# Patient Record
Sex: FEMALE | Race: BLACK OR AFRICAN AMERICAN | NOT HISPANIC OR LATINO | Employment: UNEMPLOYED | URBAN - METROPOLITAN AREA
[De-identification: names, ages, dates, MRNs, and addresses within clinical notes are randomized per-mention and may not be internally consistent; named-entity substitution may affect disease eponyms.]

---

## 2020-10-12 LAB
HBV SURFACE AG SERPL QL IA: NEGATIVE
HIV 1+2 AB+HIV1 P24 AG SERPL QL IA: NEGATIVE
RUBELLA ABY IGG: NORMAL

## 2021-04-21 LAB — GROUP B STREP PCR: POSITIVE

## 2021-05-19 ENCOUNTER — HOSPITAL ENCOUNTER (INPATIENT)
Facility: CLINIC | Age: 31
LOS: 4 days | Discharge: HOME | End: 2021-05-23
Attending: OBSTETRICS & GYNECOLOGY | Admitting: OBSTETRICS & GYNECOLOGY
Payer: COMMERCIAL

## 2021-05-19 DIAGNOSIS — Z98.891 S/P PRIMARY LOW TRANSVERSE C-SECTION: Primary | ICD-10-CM

## 2021-05-19 LAB
ABO + RH BLD: NORMAL
ABO + RH BLD: NORMAL
LABORATORY COMMENT REPORT: NORMAL
SARS-COV-2 RNA RESP QL NAA+PROBE: NEGATIVE
SPECIMEN EXP DATE BLD: NORMAL
SPECIMEN SOURCE: NORMAL

## 2021-05-19 PROCEDURE — 120N000001 HC R&B MED SURG/OB

## 2021-05-19 PROCEDURE — 3E0P7VZ INTRODUCTION OF HORMONE INTO FEMALE REPRODUCTIVE, VIA NATURAL OR ARTIFICIAL OPENING: ICD-10-PCS | Performed by: OBSTETRICS & GYNECOLOGY

## 2021-05-19 PROCEDURE — 250N000013 HC RX MED GY IP 250 OP 250 PS 637: Performed by: OBSTETRICS & GYNECOLOGY

## 2021-05-19 PROCEDURE — 86780 TREPONEMA PALLIDUM: CPT | Performed by: OBSTETRICS & GYNECOLOGY

## 2021-05-19 PROCEDURE — 86900 BLOOD TYPING SEROLOGIC ABO: CPT | Performed by: OBSTETRICS & GYNECOLOGY

## 2021-05-19 PROCEDURE — 86901 BLOOD TYPING SEROLOGIC RH(D): CPT | Performed by: OBSTETRICS & GYNECOLOGY

## 2021-05-19 PROCEDURE — 87635 SARS-COV-2 COVID-19 AMP PRB: CPT | Performed by: OBSTETRICS & GYNECOLOGY

## 2021-05-19 RX ORDER — DIPHENHYDRAMINE HCL 25 MG
25-50 CAPSULE ORAL
Status: DISCONTINUED | OUTPATIENT
Start: 2021-05-19 | End: 2021-05-20

## 2021-05-19 RX ORDER — OXYCODONE AND ACETAMINOPHEN 5; 325 MG/1; MG/1
1 TABLET ORAL
Status: DISCONTINUED | OUTPATIENT
Start: 2021-05-19 | End: 2021-05-20

## 2021-05-19 RX ORDER — PRENATAL VIT/IRON FUM/FOLIC AC 27MG-0.8MG
1 TABLET ORAL DAILY
COMMUNITY

## 2021-05-19 RX ORDER — NALOXONE HYDROCHLORIDE 0.4 MG/ML
0.4 INJECTION, SOLUTION INTRAMUSCULAR; INTRAVENOUS; SUBCUTANEOUS
Status: DISCONTINUED | OUTPATIENT
Start: 2021-05-19 | End: 2021-05-20

## 2021-05-19 RX ORDER — NALOXONE HYDROCHLORIDE 0.4 MG/ML
0.2 INJECTION, SOLUTION INTRAMUSCULAR; INTRAVENOUS; SUBCUTANEOUS
Status: DISCONTINUED | OUTPATIENT
Start: 2021-05-19 | End: 2021-05-20

## 2021-05-19 RX ORDER — OXYTOCIN/0.9 % SODIUM CHLORIDE 30/500 ML
100-340 PLASTIC BAG, INJECTION (ML) INTRAVENOUS CONTINUOUS PRN
Status: COMPLETED | OUTPATIENT
Start: 2021-05-19 | End: 2021-05-20

## 2021-05-19 RX ORDER — IBUPROFEN 800 MG/1
800 TABLET, FILM COATED ORAL
Status: DISCONTINUED | OUTPATIENT
Start: 2021-05-19 | End: 2021-05-20

## 2021-05-19 RX ORDER — METHYLERGONOVINE MALEATE 0.2 MG/ML
200 INJECTION INTRAVENOUS
Status: DISCONTINUED | OUTPATIENT
Start: 2021-05-19 | End: 2021-05-20

## 2021-05-19 RX ORDER — TRANEXAMIC ACID 10 MG/ML
1 INJECTION, SOLUTION INTRAVENOUS EVERY 30 MIN PRN
Status: DISCONTINUED | OUTPATIENT
Start: 2021-05-19 | End: 2021-05-20

## 2021-05-19 RX ORDER — SODIUM CHLORIDE, SODIUM LACTATE, POTASSIUM CHLORIDE, CALCIUM CHLORIDE 600; 310; 30; 20 MG/100ML; MG/100ML; MG/100ML; MG/100ML
INJECTION, SOLUTION INTRAVENOUS CONTINUOUS
Status: DISCONTINUED | OUTPATIENT
Start: 2021-05-19 | End: 2021-05-20

## 2021-05-19 RX ORDER — CARBOPROST TROMETHAMINE 250 UG/ML
250 INJECTION, SOLUTION INTRAMUSCULAR
Status: DISCONTINUED | OUTPATIENT
Start: 2021-05-19 | End: 2021-05-20

## 2021-05-19 RX ORDER — ACETAMINOPHEN 325 MG/1
650 TABLET ORAL EVERY 4 HOURS PRN
Status: DISCONTINUED | OUTPATIENT
Start: 2021-05-19 | End: 2021-05-20

## 2021-05-19 RX ORDER — ZOLPIDEM TARTRATE 5 MG/1
5 TABLET ORAL
Status: DISCONTINUED | OUTPATIENT
Start: 2021-05-19 | End: 2021-05-20

## 2021-05-19 RX ORDER — ONDANSETRON 2 MG/ML
4 INJECTION INTRAMUSCULAR; INTRAVENOUS EVERY 6 HOURS PRN
Status: DISCONTINUED | OUTPATIENT
Start: 2021-05-19 | End: 2021-05-20

## 2021-05-19 RX ORDER — OXYTOCIN 10 [USP'U]/ML
10 INJECTION, SOLUTION INTRAMUSCULAR; INTRAVENOUS
Status: DISCONTINUED | OUTPATIENT
Start: 2021-05-19 | End: 2021-05-20

## 2021-05-19 RX ORDER — PENICILLIN G POTASSIUM 5000000 [IU]/1
5 INJECTION, POWDER, FOR SOLUTION INTRAMUSCULAR; INTRAVENOUS ONCE
Status: COMPLETED | OUTPATIENT
Start: 2021-05-19 | End: 2021-05-20

## 2021-05-19 RX ORDER — FENTANYL CITRATE 50 UG/ML
50-100 INJECTION, SOLUTION INTRAMUSCULAR; INTRAVENOUS
Status: DISCONTINUED | OUTPATIENT
Start: 2021-05-19 | End: 2021-05-20

## 2021-05-19 RX ADMIN — DINOPROSTONE 10 MG: 10 INSERT VAGINAL at 21:18

## 2021-05-19 ASSESSMENT — MIFFLIN-ST. JEOR: SCORE: 1783.22

## 2021-05-20 ENCOUNTER — ANESTHESIA EVENT (OUTPATIENT)
Dept: OBGYN | Facility: CLINIC | Age: 31
End: 2021-05-20
Payer: COMMERCIAL

## 2021-05-20 ENCOUNTER — ANESTHESIA (OUTPATIENT)
Dept: GENERAL RADIOLOGY | Facility: CLINIC | Age: 31
End: 2021-05-20

## 2021-05-20 ENCOUNTER — APPOINTMENT (OUTPATIENT)
Dept: GENERAL RADIOLOGY | Facility: CLINIC | Age: 31
End: 2021-05-20
Attending: OBSTETRICS & GYNECOLOGY
Payer: COMMERCIAL

## 2021-05-20 ENCOUNTER — ANESTHESIA (OUTPATIENT)
Dept: OBGYN | Facility: CLINIC | Age: 31
End: 2021-05-20
Payer: COMMERCIAL

## 2021-05-20 PROBLEM — O36.8390 NON-REASSURING FETAL HEART TONES COMPLICATING PREGNANCY, ANTEPARTUM: Status: ACTIVE | Noted: 2021-05-20

## 2021-05-20 LAB — T PALLIDUM AB SER QL: NONREACTIVE

## 2021-05-20 PROCEDURE — 250N000013 HC RX MED GY IP 250 OP 250 PS 637: Performed by: OBSTETRICS & GYNECOLOGY

## 2021-05-20 PROCEDURE — 370N000017 HC ANESTHESIA TECHNICAL FEE, PER MIN: Performed by: OBSTETRICS & GYNECOLOGY

## 2021-05-20 PROCEDURE — 272N000001 HC OR GENERAL SUPPLY STERILE: Performed by: OBSTETRICS & GYNECOLOGY

## 2021-05-20 PROCEDURE — 710N000010 HC RECOVERY PHASE 1, LEVEL 2, PER MIN: Performed by: OBSTETRICS & GYNECOLOGY

## 2021-05-20 PROCEDURE — 00HU33Z INSERTION OF INFUSION DEVICE INTO SPINAL CANAL, PERCUTANEOUS APPROACH: ICD-10-PCS | Performed by: ANESTHESIOLOGY

## 2021-05-20 PROCEDURE — 88307 TISSUE EXAM BY PATHOLOGIST: CPT | Mod: 26

## 2021-05-20 PROCEDURE — 250N000011 HC RX IP 250 OP 636: Performed by: NURSE ANESTHETIST, CERTIFIED REGISTERED

## 2021-05-20 PROCEDURE — 999N000063 XR ABDOMEN PORT 1 VIEWS

## 2021-05-20 PROCEDURE — 250N000009 HC RX 250: Performed by: OBSTETRICS & GYNECOLOGY

## 2021-05-20 PROCEDURE — 250N000011 HC RX IP 250 OP 636: Performed by: OBSTETRICS & GYNECOLOGY

## 2021-05-20 PROCEDURE — 250N000011 HC RX IP 250 OP 636: Performed by: ANESTHESIOLOGY

## 2021-05-20 PROCEDURE — 360N000076 HC SURGERY LEVEL 3, PER MIN: Performed by: OBSTETRICS & GYNECOLOGY

## 2021-05-20 PROCEDURE — 258N000003 HC RX IP 258 OP 636: Performed by: NURSE ANESTHETIST, CERTIFIED REGISTERED

## 2021-05-20 PROCEDURE — 3E0R3BZ INTRODUCTION OF ANESTHETIC AGENT INTO SPINAL CANAL, PERCUTANEOUS APPROACH: ICD-10-PCS | Performed by: ANESTHESIOLOGY

## 2021-05-20 PROCEDURE — 120N000001 HC R&B MED SURG/OB

## 2021-05-20 PROCEDURE — 88307 TISSUE EXAM BY PATHOLOGIST: CPT | Mod: TC | Performed by: OBSTETRICS & GYNECOLOGY

## 2021-05-20 PROCEDURE — 258N000003 HC RX IP 258 OP 636: Performed by: OBSTETRICS & GYNECOLOGY

## 2021-05-20 RX ORDER — ONDANSETRON 2 MG/ML
INJECTION INTRAMUSCULAR; INTRAVENOUS PRN
Status: DISCONTINUED | OUTPATIENT
Start: 2021-05-20 | End: 2021-05-20

## 2021-05-20 RX ORDER — NALOXONE HYDROCHLORIDE 0.4 MG/ML
0.4 INJECTION, SOLUTION INTRAMUSCULAR; INTRAVENOUS; SUBCUTANEOUS
Status: DISCONTINUED | OUTPATIENT
Start: 2021-05-20 | End: 2021-05-23 | Stop reason: HOSPADM

## 2021-05-20 RX ORDER — DEXAMETHASONE SODIUM PHOSPHATE 4 MG/ML
INJECTION, SOLUTION INTRA-ARTICULAR; INTRALESIONAL; INTRAMUSCULAR; INTRAVENOUS; SOFT TISSUE PRN
Status: DISCONTINUED | OUTPATIENT
Start: 2021-05-20 | End: 2021-05-20

## 2021-05-20 RX ORDER — ONDANSETRON 2 MG/ML
4 INJECTION INTRAMUSCULAR; INTRAVENOUS EVERY 6 HOURS PRN
Status: DISCONTINUED | OUTPATIENT
Start: 2021-05-20 | End: 2021-05-23 | Stop reason: HOSPADM

## 2021-05-20 RX ORDER — MORPHINE SULFATE 1 MG/ML
INJECTION, SOLUTION EPIDURAL; INTRATHECAL; INTRAVENOUS PRN
Status: DISCONTINUED | OUTPATIENT
Start: 2021-05-20 | End: 2021-05-20

## 2021-05-20 RX ORDER — FENTANYL CITRATE 50 UG/ML
100 INJECTION, SOLUTION INTRAMUSCULAR; INTRAVENOUS ONCE
Status: COMPLETED | OUTPATIENT
Start: 2021-05-20 | End: 2021-05-20

## 2021-05-20 RX ORDER — NALOXONE HYDROCHLORIDE 0.4 MG/ML
0.2 INJECTION, SOLUTION INTRAMUSCULAR; INTRAVENOUS; SUBCUTANEOUS
Status: DISCONTINUED | OUTPATIENT
Start: 2021-05-20 | End: 2021-05-23 | Stop reason: HOSPADM

## 2021-05-20 RX ORDER — ONDANSETRON 2 MG/ML
4 INJECTION INTRAMUSCULAR; INTRAVENOUS EVERY 30 MIN PRN
Status: DISCONTINUED | OUTPATIENT
Start: 2021-05-20 | End: 2021-05-20 | Stop reason: HOSPADM

## 2021-05-20 RX ORDER — AMOXICILLIN 250 MG
2 CAPSULE ORAL 2 TIMES DAILY
Status: DISCONTINUED | OUTPATIENT
Start: 2021-05-20 | End: 2021-05-23 | Stop reason: HOSPADM

## 2021-05-20 RX ORDER — OXYTOCIN/0.9 % SODIUM CHLORIDE 30/500 ML
100 PLASTIC BAG, INJECTION (ML) INTRAVENOUS CONTINUOUS
Status: DISCONTINUED | OUTPATIENT
Start: 2021-05-20 | End: 2021-05-23 | Stop reason: HOSPADM

## 2021-05-20 RX ORDER — IBUPROFEN 800 MG/1
800 TABLET, FILM COATED ORAL EVERY 6 HOURS
Status: DISCONTINUED | OUTPATIENT
Start: 2021-05-21 | End: 2021-05-23 | Stop reason: HOSPADM

## 2021-05-20 RX ORDER — LIDOCAINE 40 MG/G
CREAM TOPICAL
Status: DISCONTINUED | OUTPATIENT
Start: 2021-05-20 | End: 2021-05-23 | Stop reason: HOSPADM

## 2021-05-20 RX ORDER — OXYTOCIN 10 [USP'U]/ML
10 INJECTION, SOLUTION INTRAMUSCULAR; INTRAVENOUS
Status: DISCONTINUED | OUTPATIENT
Start: 2021-05-20 | End: 2021-05-23 | Stop reason: HOSPADM

## 2021-05-20 RX ORDER — PHENYLEPHRINE HYDROCHLORIDE 10 MG/ML
INJECTION INTRAVENOUS PRN
Status: DISCONTINUED | OUTPATIENT
Start: 2021-05-20 | End: 2021-05-20

## 2021-05-20 RX ORDER — ACETAMINOPHEN 325 MG/1
975 TABLET ORAL EVERY 6 HOURS
Status: DISCONTINUED | OUTPATIENT
Start: 2021-05-20 | End: 2021-05-23 | Stop reason: HOSPADM

## 2021-05-20 RX ORDER — SODIUM CHLORIDE, SODIUM LACTATE, POTASSIUM CHLORIDE, CALCIUM CHLORIDE 600; 310; 30; 20 MG/100ML; MG/100ML; MG/100ML; MG/100ML
INJECTION, SOLUTION INTRAVENOUS CONTINUOUS PRN
Status: DISCONTINUED | OUTPATIENT
Start: 2021-05-20 | End: 2021-05-20

## 2021-05-20 RX ORDER — METHYLERGONOVINE MALEATE 0.2 MG/ML
200 INJECTION INTRAVENOUS
Status: DISCONTINUED | OUTPATIENT
Start: 2021-05-20 | End: 2021-05-23 | Stop reason: HOSPADM

## 2021-05-20 RX ORDER — HYDROCORTISONE 2.5 %
CREAM (GRAM) TOPICAL 3 TIMES DAILY PRN
Status: DISCONTINUED | OUTPATIENT
Start: 2021-05-20 | End: 2021-05-23 | Stop reason: HOSPADM

## 2021-05-20 RX ORDER — ONDANSETRON 4 MG/1
4 TABLET, ORALLY DISINTEGRATING ORAL EVERY 30 MIN PRN
Status: DISCONTINUED | OUTPATIENT
Start: 2021-05-20 | End: 2021-05-20 | Stop reason: HOSPADM

## 2021-05-20 RX ORDER — SODIUM CHLORIDE, SODIUM LACTATE, POTASSIUM CHLORIDE, CALCIUM CHLORIDE 600; 310; 30; 20 MG/100ML; MG/100ML; MG/100ML; MG/100ML
INJECTION, SOLUTION INTRAVENOUS CONTINUOUS
Status: DISCONTINUED | OUTPATIENT
Start: 2021-05-20 | End: 2021-05-20 | Stop reason: HOSPADM

## 2021-05-20 RX ORDER — SCOLOPAMINE TRANSDERMAL SYSTEM 1 MG/1
1 PATCH, EXTENDED RELEASE TRANSDERMAL
Status: DISCONTINUED | OUTPATIENT
Start: 2021-05-20 | End: 2021-05-20

## 2021-05-20 RX ORDER — BISACODYL 10 MG
10 SUPPOSITORY, RECTAL RECTAL DAILY PRN
Status: DISCONTINUED | OUTPATIENT
Start: 2021-05-22 | End: 2021-05-23 | Stop reason: HOSPADM

## 2021-05-20 RX ORDER — ACETAMINOPHEN 325 MG/1
975 TABLET ORAL ONCE
Status: DISCONTINUED | OUTPATIENT
Start: 2021-05-20 | End: 2021-05-20 | Stop reason: HOSPADM

## 2021-05-20 RX ORDER — MODIFIED LANOLIN
OINTMENT (GRAM) TOPICAL
Status: DISCONTINUED | OUTPATIENT
Start: 2021-05-20 | End: 2021-05-23 | Stop reason: HOSPADM

## 2021-05-20 RX ORDER — FENTANYL CITRATE-0.9 % NACL/PF 10 MCG/ML
100 PLASTIC BAG, INJECTION (ML) INTRAVENOUS EVERY 5 MIN PRN
Status: DISCONTINUED | OUTPATIENT
Start: 2021-05-20 | End: 2021-05-20

## 2021-05-20 RX ORDER — FENTANYL CITRATE 50 UG/ML
INJECTION, SOLUTION INTRAMUSCULAR; INTRAVENOUS
Status: COMPLETED | OUTPATIENT
Start: 2021-05-20 | End: 2021-05-20

## 2021-05-20 RX ORDER — DEXTROSE, SODIUM CHLORIDE, SODIUM LACTATE, POTASSIUM CHLORIDE, AND CALCIUM CHLORIDE 5; .6; .31; .03; .02 G/100ML; G/100ML; G/100ML; G/100ML; G/100ML
INJECTION, SOLUTION INTRAVENOUS CONTINUOUS
Status: DISCONTINUED | OUTPATIENT
Start: 2021-05-20 | End: 2021-05-23 | Stop reason: HOSPADM

## 2021-05-20 RX ORDER — PRENATAL VIT/IRON FUM/FOLIC AC 27MG-0.8MG
1 TABLET ORAL DAILY
Status: DISCONTINUED | OUTPATIENT
Start: 2021-05-20 | End: 2021-05-23 | Stop reason: HOSPADM

## 2021-05-20 RX ORDER — MISOPROSTOL 200 UG/1
800 TABLET ORAL
Status: DISCONTINUED | OUTPATIENT
Start: 2021-05-20 | End: 2021-05-23 | Stop reason: HOSPADM

## 2021-05-20 RX ORDER — CARBOPROST TROMETHAMINE 250 UG/ML
250 INJECTION, SOLUTION INTRAMUSCULAR
Status: DISCONTINUED | OUTPATIENT
Start: 2021-05-20 | End: 2021-05-23 | Stop reason: HOSPADM

## 2021-05-20 RX ORDER — SIMETHICONE 80 MG
80 TABLET,CHEWABLE ORAL 4 TIMES DAILY PRN
Status: DISCONTINUED | OUTPATIENT
Start: 2021-05-20 | End: 2021-05-23 | Stop reason: HOSPADM

## 2021-05-20 RX ORDER — KETOROLAC TROMETHAMINE 30 MG/ML
30 INJECTION, SOLUTION INTRAMUSCULAR; INTRAVENOUS EVERY 6 HOURS
Status: COMPLETED | OUTPATIENT
Start: 2021-05-20 | End: 2021-05-21

## 2021-05-20 RX ORDER — FENTANYL CITRATE 50 UG/ML
INJECTION, SOLUTION INTRAMUSCULAR; INTRAVENOUS PRN
Status: DISCONTINUED | OUTPATIENT
Start: 2021-05-20 | End: 2021-05-20

## 2021-05-20 RX ORDER — FENTANYL CITRATE 50 UG/ML
25-50 INJECTION, SOLUTION INTRAMUSCULAR; INTRAVENOUS EVERY 5 MIN PRN
Status: DISCONTINUED | OUTPATIENT
Start: 2021-05-20 | End: 2021-05-20 | Stop reason: HOSPADM

## 2021-05-20 RX ORDER — OXYTOCIN/0.9 % SODIUM CHLORIDE 30/500 ML
340 PLASTIC BAG, INJECTION (ML) INTRAVENOUS CONTINUOUS PRN
Status: DISCONTINUED | OUTPATIENT
Start: 2021-05-20 | End: 2021-05-23 | Stop reason: HOSPADM

## 2021-05-20 RX ORDER — NALBUPHINE HYDROCHLORIDE 10 MG/ML
2.5-5 INJECTION, SOLUTION INTRAMUSCULAR; INTRAVENOUS; SUBCUTANEOUS EVERY 6 HOURS PRN
Status: DISCONTINUED | OUTPATIENT
Start: 2021-05-20 | End: 2021-05-20

## 2021-05-20 RX ORDER — KETOROLAC TROMETHAMINE 30 MG/ML
INJECTION, SOLUTION INTRAMUSCULAR; INTRAVENOUS PRN
Status: DISCONTINUED | OUTPATIENT
Start: 2021-05-20 | End: 2021-05-20

## 2021-05-20 RX ORDER — OXYCODONE HYDROCHLORIDE 5 MG/1
5 TABLET ORAL EVERY 4 HOURS PRN
Status: DISCONTINUED | OUTPATIENT
Start: 2021-05-20 | End: 2021-05-23 | Stop reason: HOSPADM

## 2021-05-20 RX ORDER — PROPOFOL 10 MG/ML
INJECTION, EMULSION INTRAVENOUS PRN
Status: DISCONTINUED | OUTPATIENT
Start: 2021-05-20 | End: 2021-05-20

## 2021-05-20 RX ORDER — TRANEXAMIC ACID 10 MG/ML
1 INJECTION, SOLUTION INTRAVENOUS EVERY 30 MIN PRN
Status: DISCONTINUED | OUTPATIENT
Start: 2021-05-20 | End: 2021-05-23 | Stop reason: HOSPADM

## 2021-05-20 RX ORDER — AMOXICILLIN 250 MG
1 CAPSULE ORAL 2 TIMES DAILY
Status: DISCONTINUED | OUTPATIENT
Start: 2021-05-20 | End: 2021-05-23 | Stop reason: HOSPADM

## 2021-05-20 RX ADMIN — ONDANSETRON 4 MG: 2 INJECTION INTRAMUSCULAR; INTRAVENOUS at 09:15

## 2021-05-20 RX ADMIN — PHENYLEPHRINE HYDROCHLORIDE 100 MCG: 10 INJECTION INTRAVENOUS at 09:08

## 2021-05-20 RX ADMIN — PHENYLEPHRINE HYDROCHLORIDE 100 MCG: 10 INJECTION INTRAVENOUS at 09:15

## 2021-05-20 RX ADMIN — FENTANYL CITRATE 25 MCG: 50 INJECTION, SOLUTION INTRAMUSCULAR; INTRAVENOUS at 09:19

## 2021-05-20 RX ADMIN — DEXAMETHASONE SODIUM PHOSPHATE 4 MG: 4 INJECTION, SOLUTION INTRA-ARTICULAR; INTRALESIONAL; INTRAMUSCULAR; INTRAVENOUS; SOFT TISSUE at 09:15

## 2021-05-20 RX ADMIN — ACETAMINOPHEN 975 MG: 325 TABLET, FILM COATED ORAL at 12:03

## 2021-05-20 RX ADMIN — KETOROLAC TROMETHAMINE 30 MG: 30 INJECTION, SOLUTION INTRAMUSCULAR; INTRAVENOUS at 15:52

## 2021-05-20 RX ADMIN — SODIUM CHLORIDE, POTASSIUM CHLORIDE, SODIUM LACTATE AND CALCIUM CHLORIDE: 600; 310; 30; 20 INJECTION, SOLUTION INTRAVENOUS at 07:44

## 2021-05-20 RX ADMIN — ACETAMINOPHEN 975 MG: 325 TABLET, FILM COATED ORAL at 18:36

## 2021-05-20 RX ADMIN — KETOROLAC TROMETHAMINE 30 MG: 30 INJECTION, SOLUTION INTRAMUSCULAR; INTRAVENOUS at 21:50

## 2021-05-20 RX ADMIN — MORPHINE SULFATE 3 MG: 1 INJECTION EPIDURAL; INTRATHECAL; INTRAVENOUS at 08:57

## 2021-05-20 RX ADMIN — SODIUM CHLORIDE, POTASSIUM CHLORIDE, SODIUM LACTATE AND CALCIUM CHLORIDE: 600; 310; 30; 20 INJECTION, SOLUTION INTRAVENOUS at 05:53

## 2021-05-20 RX ADMIN — KETOROLAC TROMETHAMINE 30 MG: 30 INJECTION, SOLUTION INTRAMUSCULAR at 09:26

## 2021-05-20 RX ADMIN — HYDROMORPHONE HYDROCHLORIDE 0.5 MG: 1 INJECTION, SOLUTION INTRAMUSCULAR; INTRAVENOUS; SUBCUTANEOUS at 09:34

## 2021-05-20 RX ADMIN — FENTANYL CITRATE 100 MCG: 50 INJECTION, SOLUTION INTRAMUSCULAR; INTRAVENOUS at 05:06

## 2021-05-20 RX ADMIN — ACETAMINOPHEN 975 MG: 325 TABLET, FILM COATED ORAL at 23:59

## 2021-05-20 RX ADMIN — PROPOFOL 150 MG: 10 INJECTION, EMULSION INTRAVENOUS at 08:51

## 2021-05-20 RX ADMIN — Medication 5 ML/HR: at 08:26

## 2021-05-20 RX ADMIN — PENICILLIN G POTASSIUM 5 MILLION UNITS: 5000000 POWDER, FOR SOLUTION INTRAMUSCULAR; INTRAPLEURAL; INTRATHECAL; INTRAVENOUS at 07:40

## 2021-05-20 RX ADMIN — SODIUM CHLORIDE, POTASSIUM CHLORIDE, SODIUM LACTATE AND CALCIUM CHLORIDE: 600; 310; 30; 20 INJECTION, SOLUTION INTRAVENOUS at 09:50

## 2021-05-20 RX ADMIN — FENTANYL CITRATE 50 MCG: 50 INJECTION, SOLUTION INTRAMUSCULAR; INTRAVENOUS at 09:24

## 2021-05-20 RX ADMIN — PROPOFOL 50 MG: 10 INJECTION, EMULSION INTRAVENOUS at 08:52

## 2021-05-20 RX ADMIN — Medication 100 ML/HR: at 11:27

## 2021-05-20 RX ADMIN — FENTANYL CITRATE 100 MCG: 50 INJECTION, SOLUTION INTRAMUSCULAR; INTRAVENOUS at 08:20

## 2021-05-20 RX ADMIN — FENTANYL CITRATE 25 MCG: 50 INJECTION, SOLUTION INTRAMUSCULAR; INTRAVENOUS at 09:15

## 2021-05-20 RX ADMIN — SUCCINYLCHOLINE CHLORIDE 60 MG: 20 INJECTION, SOLUTION INTRAMUSCULAR; INTRAVENOUS at 08:51

## 2021-05-20 RX ADMIN — FENTANYL CITRATE 100 MCG: 50 INJECTION, SOLUTION INTRAMUSCULAR; INTRAVENOUS at 02:35

## 2021-05-20 RX ADMIN — FENTANYL CITRATE 100 MCG: 50 INJECTION, SOLUTION INTRAMUSCULAR; INTRAVENOUS at 08:21

## 2021-05-20 RX ADMIN — SODIUM CHLORIDE, SODIUM LACTATE, POTASSIUM CHLORIDE, CALCIUM CHLORIDE AND DEXTROSE MONOHYDRATE: 5; 600; 310; 30; 20 INJECTION, SOLUTION INTRAVENOUS at 16:51

## 2021-05-20 RX ADMIN — HYDROMORPHONE HYDROCHLORIDE 0.5 MG: 1 INJECTION, SOLUTION INTRAMUSCULAR; INTRAVENOUS; SUBCUTANEOUS at 09:42

## 2021-05-20 RX ADMIN — DOCUSATE SODIUM 50 MG AND SENNOSIDES 8.6 MG 1 TABLET: 8.6; 5 TABLET, FILM COATED ORAL at 21:50

## 2021-05-20 NOTE — PROVIDER NOTIFICATION
21 0834   Provider Notification   Provider Name/Title Dr Martinez    Method of Notification At Bedside   Request Evaluate in Person   Notification Reason Decels   After epidural pt was placed in left lateral position. FHR decles noted with a nadar of 115's lasting approx 100 seconds returning to a baseline in the 135's. Unable to trace any contractions although monitor seems to be placed appropriately. Abd palpated and found to be soft. FHR then dropped onto 60's, prolonged deceleration lasting 290 seconds.  brief recovery then FHR into another decel with a nadar in the 50's. Multiple Position changes, IV fluids bolus and Dr Martinez on unit. Call bedside to evaluate decels. FSE was placed by MD with AROM of clear fluid. Decision made to precede to STAT  section. Verbal consent received from pt.

## 2021-05-20 NOTE — PLAN OF CARE
Data: Cinthya Ang transferred to [facility] via cart at 1145. Baby in NICU, able to visit prior to transfer to mother/baby unit.  Action: Receiving unit notified of transfer: Yes. Patient and family notified of room change. Report given to Elizabet WEN RN at 1150. Belongings sent to receiving unit. Accompanied by Registered Nurse. Oriented patient to surroundings. Call light within reach.  Turned in bed and pericare done.  Response: Patient tolerated transfer and is stable.    Patients mobililty level scored using the bedside mobility assistance tool (BMAT). Patient is at a mobility level test number: 1. Mobility equipment used: [employer]. Required assist of 2 staff members. Further use of BMAT scoring required.

## 2021-05-20 NOTE — PLAN OF CARE
Assumed cares at 11.50 am, heating pad given for backache of 4 , improved with heating pad. VS all stable, incision well approximated and open to air, BS audible , has eaten a light diet from home as feels hungry, no nausea, orientated to room. IPAD in situ to watch baby in NICU. FOB present and supportive. IV pitocin infusing. Isaac catheter patent and draining.Plan to start pumping later this afternoon.  Patients mobililty level scored using the bedside mobility assistance tool (BMAT). Patient is at a mobility level test number: 1. Mobility equipment used: [employer]. Required assist of 3 staff members. Further use of BMAT scoring required.

## 2021-05-20 NOTE — ANESTHESIA PREPROCEDURE EVALUATION
Anesthesia Pre-Procedure Evaluation    Patient: Cinthya Ang   MRN: [id number] : 1990        Preoperative Diagnosis: * No pre-op diagnosis entered *   Procedure : * No procedures listed *     History reviewed. No pertinent past medical history.   Past Surgical History:   Procedure Laterality Date     GYN SURGERY Left     cyst & fallopian tube removal      No Known Allergies   Social History     Tobacco Use     Smoking status: Never Smoker     Smokeless tobacco: Never Used   Substance Use Topics     Alcohol use: Not Currently      Wt Readings from Last 1 Encounters:   21 98.9 kg (218 lb)        Anesthesia Evaluation   Pt has had prior anesthetic.     No history of anesthetic complications       ROS/MED HX  ENT/Pulmonary:  - neg pulmonary ROS     Neurologic:  - neg neurologic ROS     Cardiovascular:  - neg cardiovascular ROS     METS/Exercise Tolerance:     Hematologic:       Musculoskeletal:       GI/Hepatic:  - neg GI/hepatic ROS     Renal/Genitourinary:       Endo:  - neg endo ROS     Psychiatric/Substance Use:  - neg psychiatric ROS     Infectious Disease:       Malignancy:       Other:            Physical Exam    Airway         TM distance: > 3 FB   Neck ROM: full   Mouth opening: > 3 cm    Respiratory Devices and Support         Dental           Cardiovascular             Pulmonary               Other findings:     OUTSIDE LABS:  CBC: No results found for: WBC, HGB, HCT, PLT  BMP: No results found for: NA, POTASSIUM, CHLORIDE, CO2, BUN, CR, GLC  COAGS: No results found for: PTT, INR, FIBR  POC: No results found for: BGM, HCG, HCGS  HEPATIC: No results found for: ALBUMIN, PROTTOTAL, ALT, AST, GGT, ALKPHOS, BILITOTAL, BILIDIRECT, BEBA  OTHER: No results found for: PH, LACT, A1C, GEOVANY, PHOS, MAG, LIPASE, AMYLASE, TSH, T4, T3, CRP, SED    Anesthesia Plan    ASA Status:  2      Anesthesia Type: Epidural.              Consents    Anesthesia Plan(s) and associated risks, benefits, and realistic  alternatives discussed. Questions answered and patient/representative(s) expressed understanding.     - Discussed with:  Patient         Postoperative Care            Comments:           neg OB ROS.       Parish Garcia MD

## 2021-05-20 NOTE — PROGRESS NOTES
"[facility]   PROGRESS NOTE     S. Pt states she is doing well overall. Feeling painful contractions. +FM    /56   Temp 97.9  F (36.6  C) (Oral)   Resp 18   Ht 1.753 m (5' 9\")   Wt 98.9 kg (218 lb)   BMI 32.19 kg/m      [place] 3-4   bpm, mod, a +, d-  SVE 2.5/80/0    A/P Cinthya Ang is a 30 year old  at 41w1d here for late term IOL. Pregnancy complicated by :  - s/p LSO for teratoma 2020   - hx of latent TB, neg CXR in    - failed GCT, passed 2 hr GTT  - obesity, BMI 31    - GBS pos - starting PCN now, once 4 hr gisela will attempt AROM  - start pitocin   - ok for epidural   - FHT Cat I  - continue monitoring    Dr. Nicholas Martinez  [phone]       "

## 2021-05-20 NOTE — OP NOTE
Section Procedure Note    Procedure Date: May 20, 2021     Pre-operative Diagnosis:  1. IUP at 41w1d  2. NRFHT's remote from delivery  Post-operative Diagnosis: same  Procedure: p-LTCS  Surgeon: Dr. Martinez  Assistant:   Anesthesia: General  MDA: Dr. Garcia  Estimated Blood Loss: 468 ml  Total IV Fluids: 600 ml  UOP: 0 ml (No Isaac catheter)  Findings:  - Liveborn male infant at 0854 hrs, clear fluid,. APGAR scores at 1 and 5 min were 8 and 9 respectively. EFW 3090 g.   - Placenta manually removed intact with 3VC. Normal uterus, bilateral fallopian tubes and ovaries.  - loose nuchal cord, no knots, placenta appeared normal  Indication for : STAT p-LTCS  Procedure: Final verification of patient and procedure was done. The patient was placed in the supine position with left lateral tilt and was prepped and draped in the usual sterile fashion. 2 g of Cefazolin were given preoperatively. A pfannenstiel skin incision was made with the scalpel and carried to the level of the fascia using the scalpel. The rectus fascia was dissected off of the muscle and the peritoneal cavity was entered bluntly. The  retractor was placed for protection of the bladder. A low transverse uterine incision was made and extended laterally bluntly.   Entry into the amniotic sac revealed clear fluid. Under controlled conditions, the fetal head was delivered from JESSE position. The nose and oropharynx were bulb suctioned. There was a loose nuchal cord through which baby was delivered without complications.  Both shoulders were delivered without complication.  Delayed cord clamping was done.Then the umbilical cord was doubly clamped and cut and the infant was handed to the awaiting pediatricians. Cord gases  were sent. The placenta was manually removed intact with 3-vessel cord and 30 units of pitocin were added to the existing IV bag. The placenta was  sent to pathology. The uterine cavity was cleared with a sponge. The  edges of the uterine incision were grasped with Allis clamps and the uterine incision was closed in two layers, first with a running, locking stitch of 0-vicryl, the second an imbricating non-locking stitch of 0-monocryl. Hemostasis was achieved. The abdomen and the gutters were cleared of old blood and clots. Tubes and ovaries appeared normal bilaterally. The fascia was closed with a running suture of 0-vicryl. The adipose layer was re-approximated with 2-0 plain simple interrupted stitches. The skin was closed with with 4-0 monocryl.   The patient tolerated the procedure well. Needle, sponge and instrument counts were correct. A sterile dressing was placed over the incision. The patient was taken to the recovery room in stable condition.    Specimens: cord gases, placenta  Complications: none  Disposition: Recovery  Condition: stable    Dr. JOSE ALEJANDRO Martinez  [phone]

## 2021-05-20 NOTE — PLAN OF CARE
Data: [facility] presented to [facility]: 2021  7:40 PM.  Reason for maternal/fetal assessment is cervical ripening/induction for post dates. Patient is a .  Prenatal record reviewed. Pregnancy has been uncomplicated..  Gestational Age 41w0d. VSS. Fetal movement active. Patient denies leaking of vaginal fluid/rupture of membranes, vaginal bleeding, abdominal pain, pelvic pressure, nausea, vomiting, headache, visual disturbances, epigastric or URQ pain, significant edema. Support person is present.   Action: Verbal consent for EFM. Triage assessment completed. Bill of rights reviewed.  Response: Patient verbalized agreement with plan. Will contact Dr Monika Wolf with update and for further orders.

## 2021-05-20 NOTE — PROVIDER NOTIFICATION
05/20/21 0824   Uterine Activity Assessment   Method external tocotransducer   Contraction Frequency (Minutes) adjusted at bedside for position  (pt standing at bedside for part of time segment)   Uterine Resting Tone soft by palpation   Pt up to bathroom and standing/ swaying bedside for comfort. Monitors adjusted prn. Prepping for epidural placement.  Pt sitting on edge of bed at 0815 once MDA in room for placement.

## 2021-05-20 NOTE — PROVIDER NOTIFICATION
05/20/21 0603   Provider Notification   Provider Name/Title Dr. Wolf   Method of Notification Phone   Notification Reason Status Update     MD updated on patient status. Patient getting more uncomfortable within the last few hours and has been having rolling contractions on and off for the past hour or so. IV fluids started which helped space contractions out a little bit. SVE 2-2.5/80/0 with a walter score of 9. Cervadil had come out into the vaginal canal and was removed. FHT overall moderate with accelerations and periods of minimal. Orders from MD to start pitocin per protocol if contractions space out and to start penicillin in about an hour, regardless if pitocin is started or not. MD is okay with patient eating a light breakfast and taking a shower if she wants, and then starting pitocin after if appropriate. No cervical recheck is needed before pitocin is started if contractions space out. Will update MD as needed.

## 2021-05-20 NOTE — L&D DELIVERY NOTE
Please see OP note for further details.    Dr. Nicholas Martinez  [phone]      Delivery Summary    Cinthya Ang MRN# [id number]   Age: 30 year old YOB: 1990          Tangela Ang [[id number]]    Labor Events     labor?: No   steroids: None  Labor Type: Induction/Cervical ripening  Predominate monitoring during 1st stage: continuous electronic fetal monitoring     Antibiotics received during labor?: Yes  Reason for Antibiotics: GBS  Antibiotics received for GBS: Penicillin     Induction date/time:     Cervical ripening date/time: 21       Delivery/Placenta Date and Time    Delivery Date: 21 Delivery Time:  8:54 AM   Placenta Date/Time: 2021  8:55 AM          Apgars    Living status: Living   1 Minute 5 Minute 10 Minute 15 Minute 20 Minute   Skin color: 0  1       Heart rate: 2  2       Reflex irritability: 2  2       Muscle tone: 2  2       Respiratory effort: 2  2       Total: 8  9       Apgars assigned by: AURORA MATHUR     Cord    Vessels: 3 Vessels                Cord Blood Disposition: Lab    Gases Sent?: Yes    Delayed cord clamping?: Yes    Cord Clamping Delay (seconds): 31-60 seconds        Measurements    Weight: 6 lb 13 oz       Delivery (Maternal) (Provider to Complete) ([id number])       Blood Loss  Mother: Cinthya nAg #[id number]   Start of Mother's Information    IO Blood Loss  21 0850 - 21 1000    None           End of Mother's Information  Mother: Cinthya Ang #[id number]          Placenta    Date/Time: 2021  8:55 AM           Anesthesia    Method: Epidural  Cervical dilation at placement: 0-3                       Ashleigh Martinez MD

## 2021-05-20 NOTE — PROVIDER NOTIFICATION
21   Provider Notification   Provider Name/Title Dr. Wolf   Method of Notification Phone   Request Evaluate - Remote   Notification Reason Patient Arrived     MD notified of patient arrival.  at 41 weeks here for postdates induction. Uncomplicated pregnancy history other than GBS +; will start when patient is in active labor or per protocol. Occasional contractions noted on the toco, palpating mild. FHT minimal at first but is now moderate with accelerations. SVE 1/70/-1 with a walter score of 7. Ripening options discussed with patient and she is requesting to do cervadil. Orders from MD for intrapartum order set, cervadil for cervical ripening, covid swab, ambien 5mg PO at bedtime PRN or benadryl 25-50 mg PO at bedtime PRN. Will update MD as needed.

## 2021-05-20 NOTE — H&P
[facility]     History and Physical  Obstetrics and Gynecology     Date of Admission:  2021    History of Present Illness   Cinthya Ang is a 30 year old female  41w0d with Estimated Date of Delivery: May 12, 2021 who presents for post dates induction    [facility] for [facility] of [facility].  Indication post dates.    PRENATAL COURSE  Prenatal care was received at [facility] and the  course was  - s/p LSO for teratoma 2020   - hx of latent TB, neg CXR in    - failed GCT, passed 2 hr GTT  - obesity, BMI 31       Recent Labs   Lab Test 21   ABO O   RH Pos     Rhogam not indicated   Recent Labs   Lab Test 04/21/21 10/12/20   HEPBANG  --  negative   HIAGAB  --  negative   GBS positive   --        Past Medical History    Past medical history reviewed      Past Surgical History   Past surgical history reviewed      Prior to Admission Medications   Prior to Admission Medications   Prescriptions Last Dose Informant Patient Reported? Taking?   Prenatal Vit-Fe Fumarate-FA (PRENATAL MULTIVITAMIN W/IRON) 27-0.8 MG tablet 2021 at Unknown time  Yes Yes   Sig: Take 1 tablet by mouth daily      Facility-Administered Medications: None     Allergies   No Known Allergies    Social History   I have personally reviewed the social history     Family History   Family history reviewed with patient and is noncontributory.    Immunization History   Immunizations are up to date  S/p flu 10/12/20, Tdap 3/9/21      Physical Exam   Temp: 97.6  F (36.4  C) Temp src: Oral BP: 111/81     Resp: 16        Vital Signs with Ranges  Temp:  [97.6  F (36.4  C)] 97.6  F (36.4  C)  Resp:  [16] 16  BP: (111)/(81) 111/81  Fetal Heart Tones: 130 baseline, moderate variablility, + accels, no decels and Category I  TOCO: frequency q 2.5-4 minutes    Constitutional: healthy, alert and active   Respiratory: No increased work of breathing, good air  exchange, clear to auscultation bilaterally, no crackles or wheezing  Cardiovascular: Normal apical impulse, regular rate and rhythm, normal S1 and S2, no S3 or S4, and no murmur noted  Abdomen: gravid, single vertex fetus, non-tender, EFW 7 lbs 10  Cervical Exam: / Posterior/ average/ -1       Assessment & Plan   Cinthya Ang is a 30 year old female  who presents at 41w0d for induction.   GBS positive.   NST reactive.  Category  I.     Admit - see IP orders  cervical ripening with cervidil  Prophylactic antibiotic for + GBS status when in active labor  Anticipate     Monika Wolf, DO, DO

## 2021-05-20 NOTE — ANESTHESIA PROCEDURE NOTES
Epidural catheter Procedure Note  Pre-Procedure   Staff -        Anesthesiologist:  Parish Garcia MD       Performed By: anesthesiologist       Location: OB       Procedure Start/Stop Times: 5/20/2021 8:11 AM and 5/20/2021 8:28 AM       Pre-Anesthestic Checklist: patient identified, IV checked, risks and benefits discussed, informed consent, monitors and equipment checked, pre-op evaluation and at physician/surgeon's request  Timeout:       Correct Patient: Yes        Correct Procedure: Yes        Correct Site: Yes        Correct Position: Yes   Procedure Documentation  Procedure: epidural catheter       Patient Position: sitting       Patient Prep/Sterile Barriers: sterile gloves, mask, patient draped       Skin prep: Betadine       Local skin infiltrated with 3 mL of 1% lidocaine.        Insertion Site: L3-4. (midline approach).       Technique: LORT saline        JACKSON at 7 cm.       Needle Type: JumpHawky needle       Needle Gauge: 17.        Needle Length (Inches): 3.5        Catheter: 19 G.         Catheter threaded easily.         5 cm epidural space.         Threaded 12 cm at skin.        # of attempts: 1 and  # of redirects:     Assessment/Narrative         Paresthesias: No.       Test dose of 5 mL lidocaine 1.5% w/ 1:200,000 epinephrine at 08:20.         Test dose negative, 3 minutes after injection, for signs of intravascular, subdural, or intrathecal injection.       Insertion/Infusion Method: LORT saline       Aspiration negative for Heme or CSF via Epidural Catheter.    Medication(s) Administered   0.125% Bupivacaine + 2 mcg/mL Fentanyl via CADD (Epidural), 10 mL  Fentanyl PF (Epidural), 100 mcg  Medication Administration Time: 5/20/2021 8:20 AM    Comments:  [id number], exp. 2021-12-31, lot [id number]

## 2021-05-20 NOTE — ANESTHESIA POSTPROCEDURE EVALUATION
Patient: Cinthya Ang    Procedure(s):   SECTION    Diagnosis:Fetal heart rate decelerations affecting management of mother [O36.8390]  Diagnosis Additional Information: No value filed.    Anesthesia Type:  Epidural    Note:  Disposition: Inpatient   Postop Pain Control: Uneventful            Sign Out: Well controlled pain   PONV: No   Neuro/Psych: Uneventful            Sign Out: Acceptable/Baseline neuro status   Airway/Respiratory: Uneventful            Sign Out: Acceptable/Baseline resp. status   CV/Hemodynamics: Uneventful            Sign Out: Acceptable CV status   Other NRE: NONE   DID A NON-ROUTINE EVENT OCCUR? No           Last vitals:  Vitals:    21 1005 21 1010 21 1030   BP: 119/74 124/82 (!) 123/90   Pulse: 106 93    Resp:    Temp:  98  F (36.7  C)    SpO2: 100% 100% 99%       Last vitals prior to Anesthesia Care Transfer:  CRNA VITALS  2021 0920 - 2021 1020      2021             NIBP:  (!) 134/129    NIBP Mean:  132          Electronically Signed By: Parish Garcia MD  May 20, 2021  10:39 AM

## 2021-05-20 NOTE — PROGRESS NOTES
Late entry note due to STAT CS.    I was called to evaluate patient at 0830 hrs. Upon my evaluation pt had normal vital signs and comfortable s\p epidural placement, pt on her right side. RN pointed out NRFHT's. Baseline was on the 135 bpm, with early and late decelerations, that progressed to a prolonged deceleration with don to the 50s, short intermittent recovery for 1 min to the 135 bpm and quickly went back again to another prolonged deceleration with don to the 60s. FHT's did not respond to intense maternal and fetal resuscitative measures for which decision was to proceed with a STAT CS. I briefly explained the situation to pt and her , answered questions and they both verbally consent to proceed with surgery.     Dr. Nicholas Martinez  [phone]

## 2021-05-20 NOTE — PROVIDER NOTIFICATION
05/20/21 1442   Provider Notification   Provider Name/Title DR Martinez    Method of Notification Phone   Notification Reason Other   Comments   Comments verify when nino to be removed.   nino to be removed once patient able to ambulate to bathroom.

## 2021-05-20 NOTE — ANESTHESIA CARE TRANSFER NOTE
Patient: Cinthya Ang    Procedure(s):   SECTION    Diagnosis: Fetal heart rate decelerations affecting management of mother [O36.8390]  Diagnosis Additional Information: No value filed.    Anesthesia Type:   Epidural     Note:    Oropharynx: oropharynx clear of all foreign objects and spontaneously breathing  Level of Consciousness: drowsy and awake  Oxygen Supplementation: face mask  Level of Supplemental Oxygen (L/min / FiO2): 6  Independent Airway: airway patency satisfactory and stable  Dentition: dentition unchanged  Vital Signs Stable: post-procedure vital signs reviewed and stable  Report to RN Given: handoff report given  Patient transferred to: PACU  Comments: No anesthesia issues   Handoff Report: Identifed the Patient, Identified the Reponsible Provider, Reviewed the pertinent medical history, Discussed the surgical course, Reviewed Intra-OP anesthesia mangement and issues during anesthesia, Set expectations for post-procedure period and Allowed opportunity for questions and acknowledgement of understanding      Vitals: (Last set prior to Anesthesia Care Transfer)  CRNA VITALS  2021 0920 - 2021 0959      2021             NIBP:  (!) 134/129    NIBP Mean:  132        Electronically Signed By: CAIT Lyons CRNA  May 20, 2021  9:59 AM

## 2021-05-21 ENCOUNTER — ANESTHESIA EVENT (OUTPATIENT)
Dept: GENERAL RADIOLOGY | Facility: CLINIC | Age: 31
End: 2021-05-21

## 2021-05-21 LAB
COPATH REPORT: NORMAL
HGB BLD-MCNC: 11.1 G/DL (ref 11.7–15.7)

## 2021-05-21 PROCEDURE — 250N000011 HC RX IP 250 OP 636: Performed by: OBSTETRICS & GYNECOLOGY

## 2021-05-21 PROCEDURE — 120N000001 HC R&B MED SURG/OB

## 2021-05-21 PROCEDURE — 250N000013 HC RX MED GY IP 250 OP 250 PS 637: Performed by: OBSTETRICS & GYNECOLOGY

## 2021-05-21 PROCEDURE — 36415 COLL VENOUS BLD VENIPUNCTURE: CPT | Performed by: OBSTETRICS & GYNECOLOGY

## 2021-05-21 PROCEDURE — 85018 HEMOGLOBIN: CPT | Performed by: OBSTETRICS & GYNECOLOGY

## 2021-05-21 RX ORDER — DIPHENHYDRAMINE HYDROCHLORIDE 50 MG/ML
25 INJECTION INTRAMUSCULAR; INTRAVENOUS EVERY 6 HOURS PRN
Status: DISCONTINUED | OUTPATIENT
Start: 2021-05-21 | End: 2021-05-23 | Stop reason: HOSPADM

## 2021-05-21 RX ADMIN — DOCUSATE SODIUM 50 MG AND SENNOSIDES 8.6 MG 2 TABLET: 8.6; 5 TABLET, FILM COATED ORAL at 09:07

## 2021-05-21 RX ADMIN — ACETAMINOPHEN 975 MG: 325 TABLET, FILM COATED ORAL at 22:53

## 2021-05-21 RX ADMIN — IBUPROFEN 800 MG: 800 TABLET ORAL at 22:52

## 2021-05-21 RX ADMIN — DIPHENHYDRAMINE HYDROCHLORIDE 25 MG: 50 INJECTION INTRAMUSCULAR; INTRAVENOUS at 05:24

## 2021-05-21 RX ADMIN — DOCUSATE SODIUM 50 MG AND SENNOSIDES 8.6 MG 1 TABLET: 8.6; 5 TABLET, FILM COATED ORAL at 22:53

## 2021-05-21 RX ADMIN — ACETAMINOPHEN 975 MG: 325 TABLET, FILM COATED ORAL at 16:59

## 2021-05-21 RX ADMIN — ACETAMINOPHEN 975 MG: 325 TABLET, FILM COATED ORAL at 09:07

## 2021-05-21 RX ADMIN — IBUPROFEN 800 MG: 800 TABLET ORAL at 16:59

## 2021-05-21 RX ADMIN — SODIUM CHLORIDE, SODIUM LACTATE, POTASSIUM CHLORIDE, CALCIUM CHLORIDE AND DEXTROSE MONOHYDRATE: 5; 600; 310; 30; 20 INJECTION, SOLUTION INTRAVENOUS at 01:07

## 2021-05-21 RX ADMIN — PRENATAL VITAMINS-IRON FUMARATE 27 MG IRON-FOLIC ACID 0.8 MG TABLET 1 TABLET: at 09:07

## 2021-05-21 RX ADMIN — KETOROLAC TROMETHAMINE 30 MG: 30 INJECTION, SOLUTION INTRAMUSCULAR; INTRAVENOUS at 03:56

## 2021-05-21 NOTE — PROGRESS NOTES
[facility]   Brief Post-partum Note    Name:  Cinthya Ang  MRN: [id number]    S: Patient is doing Ok, feeling tired.  Pain is appropriately controlled.  Tolerating regular diet without nausea or vomiting.  Ambulating without dizziness.  Isaac just removed, no void as of yet. No flatus nor bowel movement as of yet. Lochia less than menses.  Breast feeding.  Desires condoms for contraception.      O:   Patient Vitals for the past 24 hrs:   BP Temp Temp src Pulse Resp SpO2   21 0405 116/75 98.1  F (36.7  C) Oral -- 16 99 %   21 2334 114/70 97.8  F (36.6  C) Oral -- 18 98 %   21 2038 129/76 -- -- -- 16 --   21 1645 116/67 -- -- 83 18 98 %   21 1545 129/68 98  F (36.7  C) Oral 86 18 99 %   21 1442 133/79 -- -- 83 18 98 %   21 1345 126/71 98.2  F (36.8  C) Oral 80 -- 98 %   21 1246 138/82 -- -- 80 18 98 %   21 1215 134/73 98.4  F (36.9  C) Oral 77 18 98 %   21 1145 122/75 -- -- 84 18 97 %   21 1130 (!) 120/93 -- -- 88 20 99 %   21 1115 122/82 -- -- 87 18 99 %   21 1100 125/87 98.6  F (37  C) Oral 88 18 99 %   21 1045 125/86 -- -- -- 20 99 %   21 1030 (!) 123/90 -- -- -- 22 99 %   21 1015 124/82 -- -- 109 24 100 %   21 1010 124/82 98  F (36.7  C) Temporal 93 20 100 %   21 1005 119/74 -- -- 106 21 100 %   21 1000 113/73 -- -- 110 20 100 %   05/20/21 0950 107/68 -- -- 112 25 100 %   21 0947 119/73 96.6  F (35.9  C) Temporal 107 19 100 %     Gen:  Resting comfortably, NAD  CV:  Regular rate and rhythm   Pulm:  Non-labored breathing.  No cough or wheezing.   Abd:  Soft, appropriately tender to palpation, non-distended.  Fundus at  umbilicus, firm and non-tender.  Incision: Overlying sterile glue. Wound edges well approximately, no findings of erythema or induration.    Hgb:   Recent Labs   Lab Test 21  0646   HGB 11.1*       Assessment/Plan:  30 year old  on POD #1 s/p STAT PLTCS  for fetal indications following IOL for late term gestation.  Continue with routine postpartum management.     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 Will need FM/IM referral post-partum  - Class 1 obesity     Pain: Well-controlled with oral medications  Hgb: QBL 468cc> 11.1. VSS as noted above, asymptomatic.   GI:  BID Senna/Colace.  PRN Simethicone.   PPx:  Encouraged ambulation   Rh: Positive  Rubella: Immune  Feed: Breast  BC: Condoms    Dispo: Plan for home on POD#2-3.     Maryam Baumann MD   Pager: [phone]   May 21, 2021

## 2021-05-21 NOTE — PLAN OF CARE
Pt showered today and has been walking back and forth to NICU to visit baby. Adequate pain relief with Ibu and Tylenol. Hasn't had time to pump much today but plans to resume this ty.

## 2021-05-21 NOTE — PLAN OF CARE
Pt up independently and voiding large amounts. Denies pain at this time. Passing gas okay. Incision closed with liquid bandage. Encouraged ambulation in hallway today and shower. Pumping frequently for baby in NICU with few drops for results.  present and supportive.

## 2021-05-21 NOTE — PLAN OF CARE
VSS. Tylenol and Toradol for pain. Incision intact with liquid bandage. Mom pumping and visiting  in NICU. Voiding adequately. Ambulating independently.

## 2021-05-21 NOTE — PROGRESS NOTES
SPIRITUAL HEALTH SERVICES Progress Note  [facility]    Spiritual Health Services on call page for emergency  to L&D.  Pt spouse, Adiel, awaited news of delivery and welcome a phone consultation with Imam Saha by telephone from [facility].  Accompanied father to NICU and provided support as infant received care.  Staff transported mother from PACU when she was able.  Oriented to Spiritual Health Services for support during NICU stay.    Plan: Spiritual Health Services remains available for additional emotional/spiritual support.    Manish Jha MA  Staff   Pager: [phone]  Phone: [phone]

## 2021-05-21 NOTE — PROVIDER NOTIFICATION
05/21/21 0519   Provider Notification   Provider Name/Title Dr. Martinez   Method of Notification Phone   Request Evaluate-Remote   Notification Reason Other   Patient c/o itching. MD gave TORB for 25 mg Benadryl IV, can also place 25 mg PO benadryl order if pt is still itching after IV is D/C'd.

## 2021-05-21 NOTE — PLAN OF CARE
VS and assessment WNL.  Ambulated to bathroom, unable to void at this time, had a scant void of a few mls, bladder scanned for 239ml at 2230, patient plans to try again in an hour.  Tolerating regular diet, encouraged to increase oral fluids.  Pain managed with Toradol and tylenol- see MAR.  Incision with liquid bandage, open to air, no drainage or signs of infection noted.  Patient pumping every 3 hours for infant in NICU; visited NICU this evening.  Spouse at bedside and is supportive.

## 2021-05-21 NOTE — ANESTHESIA POSTPROCEDURE EVALUATION
Patient: Cinthya Ang    Procedure(s):   SECTION    Diagnosis:Fetal heart rate decelerations affecting management of mother [O36.8390]  Diagnosis Additional Information: No value filed.    Anesthesia Type:  No value filed.    Note:     Postop Pain Control: Uneventful            Sign Out: Well controlled pain   PONV: No   Neuro/Psych: Uneventful            Sign Out: Acceptable/Baseline neuro status   Airway/Respiratory: Uneventful            Sign Out: Acceptable/Baseline resp. status   CV/Hemodynamics: Uneventful            Sign Out: Acceptable CV status; No obvious hypovolemia; No obvious fluid overload   Other NRE: NONE   DID A NON-ROUTINE EVENT OCCUR? No    Event details/Postop Comments:  .Labor Epidural Post delivery note.      Doing well. VSS Temp normal. Satisfactory respiratory and cardiovascular function. Return of neurologic function. Questions encouraged and answered. Denies positional headache. Minimal side effects easily managed w/ PRN meds. No apparent anesthetic complications. No follow-up required.    I or my partner was immediately available for epidural management.    BETTYE Velez             Last vitals:  Vitals:    21 2334 21 0405 21 0845   BP: 114/70 116/75 115/57   Pulse:   77   Resp: 18 16 16   Temp: 97.8  F (36.6  C) 98.1  F (36.7  C) 98.1  F (36.7  C)   SpO2: 98% 99% 100%       Last vitals prior to Anesthesia Care Transfer:      Electronically Signed By: Kojo Velez DO  May 21, 2021  9:46 AM

## 2021-05-21 NOTE — ANESTHESIA PREPROCEDURE EVALUATION
Anesthesia Pre-Procedure Evaluation    Patient: Cinthya Ang   MRN: [id number] : 1990        Preoperative Diagnosis: Fetal heart rate decelerations affecting management of mother [O36.8390]   Procedure : Procedure(s):   SECTION     History reviewed. No pertinent past medical history.   Past Surgical History:   Procedure Laterality Date     GYN SURGERY Left     cyst & fallopian tube removal      No Known Allergies   Social History     Tobacco Use     Smoking status: Never Smoker     Smokeless tobacco: Never Used   Substance Use Topics     Alcohol use: Not Currently      Wt Readings from Last 1 Encounters:   21 98.9 kg (218 lb)        Anesthesia Evaluation            ROS/MED HX  ENT/Pulmonary:  - neg pulmonary ROS     Neurologic:  - neg neurologic ROS     Cardiovascular:  - neg cardiovascular ROS     METS/Exercise Tolerance:     Hematologic:  - neg hematologic  ROS     Musculoskeletal:       GI/Hepatic:  - neg GI/hepatic ROS     Renal/Genitourinary:       Endo:       Psychiatric/Substance Use:  - neg psychiatric ROS     Infectious Disease:       Malignancy:       Other:            Physical Exam    Airway        Mallampati: II    Neck ROM: full     Respiratory Devices and Support         Dental           Cardiovascular   cardiovascular exam normal          Pulmonary   pulmonary exam normal            Other findings: .Lab Test        21                       0646          HGB          11.1*          No lab results found.    OUTSIDE LABS:  CBC:   Lab Results   Component Value Date    HGB 11.1 (L) 2021     BMP: No results found for: NA, POTASSIUM, CHLORIDE, CO2, BUN, CR, GLC  COAGS: No results found for: PTT, INR, FIBR  POC: No results found for: BGM, HCG, HCGS  HEPATIC: No results found for: ALBUMIN, PROTTOTAL, ALT, AST, GGT, ALKPHOS, BILITOTAL, BILIDIRECT, BEBA  OTHER: No results found for: PH, LACT, A1C, GEOVANY, PHOS, MAG, LIPASE, AMYLASE, TSH, T4, T3, CRP, SED    Anesthesia Plan    ASA  Status:  2      Anesthesia Type: Epidural.              Consents    Anesthesia Plan(s) and associated risks, benefits, and realistic alternatives discussed. Questions answered and patient/representative(s) expressed understanding.     - Discussed with:  Patient         Postoperative Care            Comments:    Charting for JWS       neg OB ROS.       Kojo Velez, DO

## 2021-05-22 PROCEDURE — 120N000001 HC R&B MED SURG/OB

## 2021-05-22 PROCEDURE — 250N000013 HC RX MED GY IP 250 OP 250 PS 637: Performed by: OBSTETRICS & GYNECOLOGY

## 2021-05-22 RX ADMIN — ACETAMINOPHEN 975 MG: 325 TABLET, FILM COATED ORAL at 19:36

## 2021-05-22 RX ADMIN — PRENATAL VITAMINS-IRON FUMARATE 27 MG IRON-FOLIC ACID 0.8 MG TABLET 1 TABLET: at 08:53

## 2021-05-22 RX ADMIN — IBUPROFEN 800 MG: 800 TABLET ORAL at 19:37

## 2021-05-22 RX ADMIN — ACETAMINOPHEN 975 MG: 325 TABLET, FILM COATED ORAL at 05:22

## 2021-05-22 RX ADMIN — IBUPROFEN 800 MG: 800 TABLET ORAL at 05:22

## 2021-05-22 RX ADMIN — ACETAMINOPHEN 975 MG: 325 TABLET, FILM COATED ORAL at 12:55

## 2021-05-22 RX ADMIN — DOCUSATE SODIUM 50 MG AND SENNOSIDES 8.6 MG 1 TABLET: 8.6; 5 TABLET, FILM COATED ORAL at 21:05

## 2021-05-22 RX ADMIN — IBUPROFEN 800 MG: 800 TABLET ORAL at 12:55

## 2021-05-22 RX ADMIN — DOCUSATE SODIUM 50 MG AND SENNOSIDES 8.6 MG 1 TABLET: 8.6; 5 TABLET, FILM COATED ORAL at 08:52

## 2021-05-22 NOTE — PROGRESS NOTES
Acknowledge SW referral.  MOB not in room.  Resources information left on bed table and SW will continue to attempt visit.

## 2021-05-22 NOTE — PLAN OF CARE
Data: Vital signs within normal limits. Postpartum checks within normal limits - see flow record. Patient eating and drinking normally. Patient able to empty bladder independently and is up ambulating. No apparent signs of infection. Incision healing well. Patient performing self cares and is able to care for infant.  Action: Patient medicated during the shift for pain and cramping. See MAR. Patient reassessed within 1 hour after each medication and pain was improved - patient stated she was comfortable. Patient education done about normal postpartum findings, pain management, and breastfeeding. See flow record.  Response: Positive attachment behaviors observed with infant. Support persons  present.   Plan: Anticipate discharge on 5/23/21.

## 2021-05-22 NOTE — PLAN OF CARE
Up ad crystal and walking back and forth to NICU with . Showered again today. Had BM yesterday. Liquid bandage adhered. Pumping and getting increasing amounts.

## 2021-05-22 NOTE — LACTATION NOTE
Lactation in to see patient. Baby just transferred up from the NICU. Baby is attempting to breastfeed then supplemented with EBM. Cinthya pumping and getting good volumes of colostrum. Reviewed care of pump parts. Educated on breast milk storage. Encouraged patient to call when going to put baby to breast.

## 2021-05-22 NOTE — PLAN OF CARE
Vitals stable. Independent with self- cares. Incision site with liquid bandage, WNL. Pain controlled with Ibuprofen and Tylenol with good relief. Pumping overnight, colostrum sent to NICU. BM x1. Spouse at bedside and supportive.

## 2021-05-22 NOTE — PROGRESS NOTES
"Post op day 2, s/p repeatcesarean section    SUBJECTIVE:    Tolerating foods/fluids.  Voiding well. Pain controlled.  Baby Akram not nursing well yet.      OBJECTIVE:  Blood pressure 129/73, pulse 90, temperature 98.1  F (36.7  C), temperature source Oral, resp. rate 16, height 1.753 m (5' 9\"), weight 98.9 kg (218 lb), SpO2 100 %, currently breastfeeding.    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 Will need FM/IM referral post-partum  - Class 1 obesity      Pain:    Well-controlled with oral medications  Hgb:     QBL 468cc> 11.1. VSS as noted above, asymptomatic.   GI:       BID Senna/Colace.  PRN Simethicone.   PPx:     Encouraged ambulation   Rh:       Positive  Rubella: Immune  Feed:   Breast  BC:      Condoms    Anticipate discontinue tomorrow.    Fatoumata Peter MD May 22, 2021     "

## 2021-05-23 VITALS
HEART RATE: 95 BPM | HEIGHT: 69 IN | WEIGHT: 218 LBS | OXYGEN SATURATION: 100 % | BODY MASS INDEX: 32.29 KG/M2 | DIASTOLIC BLOOD PRESSURE: 72 MMHG | TEMPERATURE: 98.1 F | SYSTOLIC BLOOD PRESSURE: 123 MMHG | RESPIRATION RATE: 16 BRPM

## 2021-05-23 PROBLEM — Z98.891 S/P PRIMARY LOW TRANSVERSE C-SECTION: Status: ACTIVE | Noted: 2021-05-23

## 2021-05-23 PROCEDURE — 250N000013 HC RX MED GY IP 250 OP 250 PS 637: Performed by: OBSTETRICS & GYNECOLOGY

## 2021-05-23 RX ORDER — ACETAMINOPHEN 325 MG/1
325-650 TABLET ORAL EVERY 6 HOURS PRN
Qty: 60 TABLET | Refills: 1 | Status: SHIPPED | OUTPATIENT
Start: 2021-05-23

## 2021-05-23 RX ORDER — AMOXICILLIN 250 MG
1 CAPSULE ORAL 2 TIMES DAILY PRN
Qty: 30 TABLET | Refills: 1 | Status: ON HOLD | OUTPATIENT
Start: 2021-05-23 | End: 2022-12-04

## 2021-05-23 RX ORDER — IBUPROFEN 800 MG/1
800 TABLET, FILM COATED ORAL EVERY 6 HOURS
Qty: 60 TABLET | Refills: 1 | Status: SHIPPED | OUTPATIENT
Start: 2021-05-23

## 2021-05-23 RX ADMIN — DOCUSATE SODIUM 50 MG AND SENNOSIDES 8.6 MG 1 TABLET: 8.6; 5 TABLET, FILM COATED ORAL at 08:09

## 2021-05-23 RX ADMIN — IBUPROFEN 800 MG: 800 TABLET ORAL at 02:11

## 2021-05-23 RX ADMIN — IBUPROFEN 800 MG: 800 TABLET ORAL at 08:11

## 2021-05-23 RX ADMIN — ACETAMINOPHEN 975 MG: 325 TABLET, FILM COATED ORAL at 02:11

## 2021-05-23 RX ADMIN — PRENATAL VITAMINS-IRON FUMARATE 27 MG IRON-FOLIC ACID 0.8 MG TABLET 1 TABLET: at 08:11

## 2021-05-23 RX ADMIN — ACETAMINOPHEN 975 MG: 325 TABLET, FILM COATED ORAL at 08:11

## 2021-05-23 RX ADMIN — DOCUSATE SODIUM 50 MG AND SENNOSIDES 8.6 MG 2 TABLET: 8.6; 5 TABLET, FILM COATED ORAL at 08:10

## 2021-05-23 NOTE — PROGRESS NOTES
[facility] Obstetrics Post-Op / Progress Note    Interval History   Doing well.  Pain is well-controlled.  No fevers.  No history of wound drainage, warmth or significant erythema.  Good appetite.  Denies chest pain, shortness of breath, nausea or vomiting.  Ambulatory.  Breastfeeding and formula feeding well.    Medications     dextrose 5% lactated ringers 125 mL/hr at 21 0107     - MEDICATION INSTRUCTIONS -       NO Rho (D) immune globulin (RhoGam) needed - mother Rh POSITIVE       - MEDICATION INSTRUCTIONS -       oxytocin in 0.9% NaCl 100 mL/hr (21 1210)     oxytocin in 0.9% NaCl         acetaminophen  975 mg Oral Q6H     ibuprofen  800 mg Oral Q6H     prenatal multivitamin w/iron  1 tablet Oral Daily     senna-docusate  1 tablet Oral BID    Or     senna-docusate  2 tablet Oral BID     sodium chloride (PF)  3 mL Intracatheter Q8H       Physical Exam   Temp: 98.4  F (36.9  C) Temp src: Oral BP: 126/81 Pulse: 86   Resp: 18   O2 Device: None (Room air)    Vitals:    21   Weight: 98.9 kg (218 lb)     Vital Signs with Ranges  Temp:  [98.1  F (36.7  C)-98.4  F (36.9  C)] 98.4  F (36.9  C)  Pulse:  [86-90] 86  Resp:  [16-18] 18  BP: (126-129)/(70-81) 126/81  No intake/output data recorded.    Uterine fundus is firm, non-tender and at the level of the umbilicus  Incision C/D/I    Data   Recent Labs   Lab Test 21  2040   ABO O   RH Pos     Recent Labs   Lab Test 21  0646   HGB 11.1*     No lab results found.    Assessment & Plan   -3 Days Post-Op Procedure(s):   SECTION    Doing well.  Clean wound without signs of infection.  No immediate surgical complications identified.  Pain well-controlled.  Pain control measures as needed  Reportable signs and symptoms dicussed with the patient  Discharge later today    Monika Wolf, , DO

## 2021-05-23 NOTE — PLAN OF CARE
VSS. Up ad crystal. Breastfeeding and tolerating well, also pumping and bottle feeding maternal expressed breast milk and supplementing with human donor milk. Scant amount of lochia, no clots noted. Incision covered with liquid bandage, CDI. Tylenol and Ibuprofen for pain control. BS audible/active x4, tolerating PO, denies N/V. Voiding. Bonding well with infant, attentive to cares. Significant other at bedside and supportive. Continue with plan of care.

## 2021-05-23 NOTE — DISCHARGE SUMMARY
[facility]    Discharge Summary  Obstetrics    Date of Admission:  2021  Date of Discharge:  2021  Discharging Provider: Monika Wolf MD  Date of Service (when I saw the patient): 21    Discharge Diagnoses   Fetal bradycardia  Primary  section    Procedure/Surgery Information   Procedure: Procedure(s):   SECTION   Surgeon(s): Surgeon(s) and Role:     * Ashleigh Martinez MD - Primary     History of Present Illness   Cinthya Ang is a 30 year old female who presented for post dates induction. She received cervidil and was started on pitocin.  She received her epidural but then had NRFT with prolonged bradycardia which led to a primary .      Hospital Course   The patient's hospital course was unremarkable.  She recovered as anticipated and experienced no post-operative complications.  On discharge, her pain was well controlled. Vaginal bleeding is similar to peak menstrual flow.  Voiding without difficulty.  Ambulating well and tolerating a normal diet.  No fever or significant wound drainage.  Breastfeeding well.  Infant is stable.  She was discharged on post-partum day #3.    Post-partum hemoglobin:   Hemoglobin   Date Value Ref Range Status   2021 11.1 (L) 11.7 - 15.7 g/dL Final       Monika Wolf DO, DO    Discharge Disposition   Discharged to home   Condition at discharge: Stable      Primary Care Physician   Physician No Ref-Primary    Consultations This Hospital Stay   CARE MANAGEMENT / SOCIAL WORK IP CONSULT  ANESTHESIOLOGY IP CONSULT  HOME CARE POST PARTUM/ IP CONSULT  LACTATION IP CONSULT    Discharge Orders      Activity    Review discharge instructions     Reason for your hospital stay    Maternity care     Discharge Instructions - Postpartum visit    Schedule postpartum visit with your provider and return to clinic in 6 weeks.     Diet    Resume previous diet     Discharge Medications   Current Discharge  Medication List      START taking these medications    Details   acetaminophen (TYLENOL) 325 MG tablet Take 1-2 tablets (325-650 mg) by mouth every 6 hours as needed for mild pain  Qty: 60 tablet, Refills: 1    Associated Diagnoses: S/P primary low transverse       ibuprofen (ADVIL/MOTRIN) 800 MG tablet Take 1 tablet (800 mg) by mouth every 6 hours  Qty: 60 tablet, Refills: 1    Associated Diagnoses: S/P primary low transverse       senna-docusate (SENOKOT-S/PERICOLACE) 8.6-50 MG tablet Take 1 tablet by mouth 2 times daily as needed for constipation  Qty: 30 tablet, Refills: 1    Associated Diagnoses: S/P primary low transverse          CONTINUE these medications which have NOT CHANGED    Details   Prenatal Vit-Fe Fumarate-FA (PRENATAL MULTIVITAMIN W/IRON) 27-0.8 MG tablet Take 1 tablet by mouth daily           Allergies   No Known Allergies

## 2021-05-23 NOTE — DISCHARGE INSTRUCTIONS
Postop  Birth Instructions  Follow up in clinic in 6 weeks    Activity       Do not lift more than 10 pounds for 6 weeks after surgery.  Ask family and friends for help when you need it.    No driving until you have stopped taking your pain medications (usually two weeks after surgery).    No heavy exercise or activity for 6 weeks.  Don't do anything that will put a strain on your surgery site.    Don't strain when using the toilet.  Your care team may prescribe a stool softener if you have problems with your bowel movements.     To care for your incision:       Keep the incision clean and dry.    Do not soak your incision in water. No swimming or hot tubs until it has fully healed. You may soak in the bathtub if the water level is below your incision.    Do not use peroxide, gel, cream, lotion, or ointment on your incision.    Adjust your clothes to avoid pressure on your surgery site (check the elastic in your underwear for example).     You may see a small amount of clear or pink drainage and this is normal.  Check with your health care provider:       If the drainage increases or has an odor.    If the incision reddens, you have swelling, or develop a rash.    If you have increased pain and the medicine we prescribed doesn't help.    If you have a fever above 100.4 F (38 C) with or without chills when placing thermometer under your tongue.   The area around your incision (surgery wound), will feel numb.  This is normal. The numbness should go away in less than a year.     Keep your hands clean:  Always wash your hands before touching your incision (surgery wound). This helps reduce your risk of infection. If your hands aren't dirty, you may use an alcohol hand-rub to clean your hands. Keep your nails clean and short.    Call your healthcare provider if you have any of these symptoms:       You soak a sanitary pad with blood within 1 hour, or you see blood clots larger than a golf ball.    Bleeding that  lasts more than 6 weeks.    Vaginal discharge that smells bad.    Severe pain, cramping or tenderness in your lower belly area.    A need to urinate more frequently (use the toilet more often), more urgently (use the toilet very quickly), or it burns when you urinate.    Nausea and vomiting.    Redness, swelling or pain around a vein in your leg.    Problems breastfeeding or a red or painful area on your breast.    Chest pain and cough or are gasping for air.    Problems with coping with sadness, anxiety or depression. If you have concerns about hurting yourself or the baby, call your provider immediately.      You have questions or concerns after you return home.

## 2021-05-23 NOTE — PLAN OF CARE
Pt meeting expected outcomes and ready to discharge home today. Good pain relief with Ibu and Tylenol. Incision closed with liquid bandage. Pumping and also trying baby at breast at times.  present and supportive. Bonding behaviors observed with baby.Discharge instructions reviewed and all questions answered. Home at 1145.

## 2022-10-22 LAB — GROUP B STREPTOCOCCUS (EXTERNAL): POSITIVE

## 2022-11-28 ENCOUNTER — HOSPITAL ENCOUNTER (OUTPATIENT)
Facility: CLINIC | Age: 32
Discharge: HOME | End: 2022-11-28
Attending: STUDENT IN AN ORGANIZED HEALTH CARE EDUCATION/TRAINING PROGRAM | Admitting: STUDENT IN AN ORGANIZED HEALTH CARE EDUCATION/TRAINING PROGRAM
Payer: COMMERCIAL

## 2022-11-28 VITALS
HEART RATE: 97 BPM | SYSTOLIC BLOOD PRESSURE: 124 MMHG | TEMPERATURE: 99.2 F | DIASTOLIC BLOOD PRESSURE: 82 MMHG | RESPIRATION RATE: 22 BRPM

## 2022-11-28 RX ORDER — ASPIRIN 81 MG/1
81 TABLET, CHEWABLE ORAL DAILY
Status: ON HOLD | COMMUNITY
End: 2022-12-03

## 2022-11-28 RX ORDER — LIDOCAINE 40 MG/G
CREAM TOPICAL
Status: DISCONTINUED | OUTPATIENT
Start: 2022-11-28 | End: 2022-11-28 | Stop reason: HOSPADM

## 2022-11-28 ASSESSMENT — ACTIVITIES OF DAILY LIVING (ADL): ADLS_ACUITY_SCORE: 35

## 2022-11-28 NOTE — PROVIDER NOTIFICATION
11/28/22 1099   Provider Notification   Provider Name/Title Dr. Ramos   Method of Notification At Bedside   Request Evaluate in Person   Patient still declining induction today, asking to go home, patient OK with scheduling induction for Friday. Induction scheduled for Friday, patient to come at 0730 or anytime before if patient desires as she is over 42 weeks gestation. Fetal and uterine tracing reviewed per Dr. Ramos. Order received  to discharge patient to home. Patient to call doctor if decrease in fetal movement, bleeding or any questions or concerns.

## 2022-11-28 NOTE — H&P
[facility] Labor and Delivery   Progress Note    Cinthya Agn MRN# [id number]   Age: 32 year old YOB: 1990     Date of Admission:  2022         HPI:   Cinthya Ang is a 32 year old  at 42w2d by EDC c/w 8w0d US (US due date 22) who presents with a 6/10 BPP (nonreactive NST and movement). Denies vaginal bleeding or loss of fluid. Notes normal fetal movement.     Pregnancy notable for:  - post-term pregnancy  - 1 prior CS  - Class 1 obesity  - Short interpregnancy interval, last delivery 2021  - History of left salpingo-oophorectomy  - GBS positive          Pregnancy history:     OBSTETRIC HISTORY:    History of CS x1 (STAT for Ca 2 FHT at 2cm)    Prenatal Labs:   O positive, Rubella immune, HIV neg, Heb B/C nonreactive, RPR negative    GBS Status: positive    Medication Prior to Admission  Medications Prior to Admission   Medication Sig Dispense Refill Last Dose     acetaminophen (TYLENOL) 325 MG tablet Take 1-2 tablets (325-650 mg) by mouth every 6 hours as needed for mild pain 60 tablet 1 More than a month     aspirin (ASA) 81 MG chewable tablet Take 81 mg by mouth daily   More than a month     Prenatal Vit-Fe Fumarate-FA (PRENATAL MULTIVITAMIN W/IRON) 27-0.8 MG tablet Take 1 tablet by mouth daily   2022     senna-docusate (SENOKOT-S/PERICOLACE) 8.6-50 MG tablet Take 1 tablet by mouth 2 times daily as needed for constipation 30 tablet 1 More than a month     ibuprofen (ADVIL/MOTRIN) 800 MG tablet Take 1 tablet (800 mg) by mouth every 6 hours 60 tablet 1    .        Maternal Past Medical History:   No past medical history on file.       Maternal Past Surgical History:     Past Surgical History:   Procedure Laterality Date      SECTION N/A 2021    Procedure:  SECTION;  Surgeon: Ashleigh Martinez MD;  Location: [facility] L+D     GYN SURGERY Left     cyst & fallopian tube removal                Physical Exam:     Vitals:    22 1253    BP: 124/82   Pulse: 97   Resp: 22   Temp: 99.2  F (37.3  C)   TempSrc: Oral     Gen: Well appearing, no apparent distress  Cardio: Extremities warm and well perfused  Resp: Breathing comfortably on room air  Abdomen: gravid, soft, nontender.   Cervix: 2/80/+1, with patient consent membranes were stripped     Fetal Heart Rate Tracing: Baseline 125, moderate variability, accels present, no decels  Tocometer: 3 contractions in 10 minutes    Imaging:  BPP today: vertex, posterior placenta, GÉNESIS 7.2,  (off for gross fetal movement)         Assessment:   Cinthya Ang is a 32 year old  at 42w2d by EDC c/w 8w0d US seen in triage for 6/10 BPP in the setting of post-term pregnancy. Now 8/10 with reactive NST x1 hour.        Plan:     Had extensive conversation about risks of post-term pregnancy including risk of stillbirth, increased risk of CS, meconium aspiration, post maturity syndrome, NICU admission, risk of STAT CS. She is worried about the risk of CS and strongly desires TOLAC. Discussed that chance of  is higher with IOL and recommended staying for IOL today. She declines but was amenable to membrane stripping which was performed. With how low and well applied the fetal head is to the cervix dicussed nino balloon may not be the best option and rather pitocin IOL may be recommended. However, this would be up to the physician on-call. Again recommended that she stay for IOL and she declines. She has a follow-up appointment on Thursday this week. She is agreeable to scheduling an IOL on Friday morning. Discussed if she changes her mind and wants to come in sooner for an IOL I would recommend that. Discussed that if she feels decreased FM she needs to return to L&D.     Sultana Ramos MD

## 2022-11-28 NOTE — PROVIDER NOTIFICATION
22 1304   Provider Notification   Provider Name/Title Dr. Ramos   Method of Notification At Bedside   Request Evaluate in Person   , 42+ weeks gestation, sent from clinic for fetal monitoring. Monitors explained and applied. Having irregular contractions. Dr. Ramos at bedside, induction recommended, risks and benefits and concerns reviewed. Patient declining induction at this time.

## 2022-11-28 NOTE — DISCHARGE INSTRUCTIONS
Discharge Instruction for Undelivered Patients      You were seen for: Fetal Assessment  We Consulted: Dr. Ramos  You had (Test or Medicine):fetal and uterine monitoring, SVE and induction recommended.      Diet:   Drink 8 to 12 glasses of liquids (milk, juice, water) every day.  You may eat meals and snacks.     Activity:  Call your doctor or nurse midwife if your baby is moving less than usual.     Call your provider if you notice:  Swelling in your face or increased swelling in your hands or legs.  Headaches that are not relieved by Tylenol (acetaminophen).  Changes in your vision (blurring: seeing spots or stars.)  Nausea (sick to your stomach) and vomiting (throwing up).   Weight gain of 5 pounds or more per week.  Heartburn that doesn't go away.  Signs of bladder infection: pain when you urinate (use the toilet), need to go more often and more urgently.  The bag of haro (rupture of membranes) breaks, or you notice leaking in your underwear.  Bright red blood in your underwear.  Abdominal (lower belly) or stomach pain.  For first baby: Contractions (tightening) less than 5 minutes apart for one hour or more.  Second (plus) baby: Contractions (tightening) less than 10 minutes apart and getting stronger.  *If less than 34 weeks: Contractions (tightening) more than 6 times in one hour.  Increase or change in vaginal discharge (note the color and amount)  Other: Return to the hospital 0730 AM on Friday or anytime before that if desires.  Call doctor with decreased fetal movement or questions or concerns.     Follow-up:  As scheduled in the clinic on Thursday, December 1.

## 2022-11-30 ENCOUNTER — APPOINTMENT (OUTPATIENT)
Dept: ULTRASOUND IMAGING | Facility: CLINIC | Age: 32
End: 2022-11-30
Attending: OBSTETRICS & GYNECOLOGY
Payer: COMMERCIAL

## 2022-11-30 ENCOUNTER — ANESTHESIA (OUTPATIENT)
Dept: OBGYN | Facility: CLINIC | Age: 32
End: 2022-11-30
Payer: COMMERCIAL

## 2022-11-30 ENCOUNTER — HOSPITAL ENCOUNTER (INPATIENT)
Facility: CLINIC | Age: 32
LOS: 1 days | Discharge: LEFT BEFORE DISCHARGE | End: 2022-11-30
Attending: OBSTETRICS & GYNECOLOGY | Admitting: OBSTETRICS & GYNECOLOGY
Payer: COMMERCIAL

## 2022-11-30 ENCOUNTER — ANESTHESIA EVENT (OUTPATIENT)
Dept: OBGYN | Facility: CLINIC | Age: 32
End: 2022-11-30
Payer: COMMERCIAL

## 2022-11-30 VITALS
TEMPERATURE: 97.4 F | RESPIRATION RATE: 18 BRPM | OXYGEN SATURATION: 99 % | SYSTOLIC BLOOD PRESSURE: 129 MMHG | HEART RATE: 125 BPM | DIASTOLIC BLOOD PRESSURE: 82 MMHG

## 2022-11-30 LAB
ABO/RH(D): NORMAL
ALBUMIN MFR UR ELPH: 12.2 MG/DL
ALT SERPL W P-5'-P-CCNC: 11 U/L (ref 10–35)
ANION GAP SERPL CALCULATED.3IONS-SCNC: 11 MMOL/L (ref 7–15)
ANTIBODY SCREEN: NEGATIVE
AST SERPL W P-5'-P-CCNC: 19 U/L (ref 10–35)
BUN SERPL-MCNC: 6 MG/DL (ref 6–20)
CALCIUM SERPL-MCNC: 8.9 MG/DL (ref 8.6–10)
CHLORIDE SERPL-SCNC: 102 MMOL/L (ref 98–107)
CREAT SERPL-MCNC: 0.44 MG/DL (ref 0.51–0.95)
CREAT UR-MCNC: 113.8 MG/DL
DEPRECATED HCO3 PLAS-SCNC: 20 MMOL/L (ref 22–29)
ERYTHROCYTE [DISTWIDTH] IN BLOOD BY AUTOMATED COUNT: 13.2 % (ref 10–15)
GFR SERPL CREATININE-BSD FRML MDRD: >90 ML/MIN/1.73M2
GLUCOSE SERPL-MCNC: 101 MG/DL (ref 70–99)
HCT VFR BLD AUTO: 39.7 % (ref 35–47)
HGB BLD-MCNC: 13.3 G/DL (ref 11.7–15.7)
MCH RBC QN AUTO: 28.9 PG (ref 26.5–33)
MCHC RBC AUTO-ENTMCNC: 33.5 G/DL (ref 31.5–36.5)
MCV RBC AUTO: 86 FL (ref 78–100)
PLATELET # BLD AUTO: 179 10E3/UL (ref 150–450)
POTASSIUM SERPL-SCNC: 3.6 MMOL/L (ref 3.4–5.3)
PROT/CREAT 24H UR: 0.11 MG/MG CR (ref 0–0.2)
RBC # BLD AUTO: 4.6 10E6/UL (ref 3.8–5.2)
SARS-COV-2 RNA RESP QL NAA+PROBE: NEGATIVE
SODIUM SERPL-SCNC: 133 MMOL/L (ref 136–145)
SPECIMEN EXPIRATION DATE: NORMAL
T PALLIDUM AB SER QL: NONREACTIVE
WBC # BLD AUTO: 10.8 10E3/UL (ref 4–11)

## 2022-11-30 PROCEDURE — 76819 FETAL BIOPHYS PROFIL W/O NST: CPT

## 2022-11-30 PROCEDURE — 84156 ASSAY OF PROTEIN URINE: CPT | Performed by: OBSTETRICS & GYNECOLOGY

## 2022-11-30 PROCEDURE — U0003 INFECTIOUS AGENT DETECTION BY NUCLEIC ACID (DNA OR RNA); SEVERE ACUTE RESPIRATORY SYNDROME CORONAVIRUS 2 (SARS-COV-2) (CORONAVIRUS DISEASE [COVID-19]), AMPLIFIED PROBE TECHNIQUE, MAKING USE OF HIGH THROUGHPUT TECHNOLOGIES AS DESCRIBED BY CMS-2020-01-R: HCPCS | Performed by: OBSTETRICS & GYNECOLOGY

## 2022-11-30 PROCEDURE — 86780 TREPONEMA PALLIDUM: CPT | Performed by: OBSTETRICS & GYNECOLOGY

## 2022-11-30 PROCEDURE — 250N000011 HC RX IP 250 OP 636: Performed by: OBSTETRICS & GYNECOLOGY

## 2022-11-30 PROCEDURE — 86901 BLOOD TYPING SEROLOGIC RH(D): CPT | Performed by: OBSTETRICS & GYNECOLOGY

## 2022-11-30 PROCEDURE — 84450 TRANSFERASE (AST) (SGOT): CPT | Performed by: OBSTETRICS & GYNECOLOGY

## 2022-11-30 PROCEDURE — 85027 COMPLETE CBC AUTOMATED: CPT | Performed by: OBSTETRICS & GYNECOLOGY

## 2022-11-30 PROCEDURE — 80048 BASIC METABOLIC PNL TOTAL CA: CPT | Performed by: OBSTETRICS & GYNECOLOGY

## 2022-11-30 PROCEDURE — 86850 RBC ANTIBODY SCREEN: CPT | Performed by: OBSTETRICS & GYNECOLOGY

## 2022-11-30 PROCEDURE — G0463 HOSPITAL OUTPT CLINIC VISIT: HCPCS

## 2022-11-30 PROCEDURE — 84460 ALANINE AMINO (ALT) (SGPT): CPT | Performed by: OBSTETRICS & GYNECOLOGY

## 2022-11-30 PROCEDURE — 258N000003 HC RX IP 258 OP 636: Performed by: OBSTETRICS & GYNECOLOGY

## 2022-11-30 PROCEDURE — 120N000001 HC R&B MED SURG/OB

## 2022-11-30 RX ORDER — NALOXONE HYDROCHLORIDE 0.4 MG/ML
0.2 INJECTION, SOLUTION INTRAMUSCULAR; INTRAVENOUS; SUBCUTANEOUS
Status: DISCONTINUED | OUTPATIENT
Start: 2022-11-30 | End: 2022-11-30 | Stop reason: HOSPADM

## 2022-11-30 RX ORDER — PROCHLORPERAZINE MALEATE 10 MG
10 TABLET ORAL EVERY 6 HOURS PRN
Status: DISCONTINUED | OUTPATIENT
Start: 2022-11-30 | End: 2022-11-30 | Stop reason: HOSPADM

## 2022-11-30 RX ORDER — ONDANSETRON 2 MG/ML
4 INJECTION INTRAMUSCULAR; INTRAVENOUS EVERY 6 HOURS PRN
Status: DISCONTINUED | OUTPATIENT
Start: 2022-11-30 | End: 2022-11-30 | Stop reason: HOSPADM

## 2022-11-30 RX ORDER — OXYTOCIN/0.9 % SODIUM CHLORIDE 30/500 ML
340 PLASTIC BAG, INJECTION (ML) INTRAVENOUS CONTINUOUS PRN
Status: DISCONTINUED | OUTPATIENT
Start: 2022-11-30 | End: 2022-11-30 | Stop reason: HOSPADM

## 2022-11-30 RX ORDER — PENICILLIN G 3000000 [IU]/50ML
3 INJECTION, SOLUTION INTRAVENOUS EVERY 4 HOURS
Status: DISCONTINUED | OUTPATIENT
Start: 2022-11-30 | End: 2022-11-30 | Stop reason: HOSPADM

## 2022-11-30 RX ORDER — PENICILLIN G POTASSIUM 5000000 [IU]/1
5 INJECTION, POWDER, FOR SOLUTION INTRAMUSCULAR; INTRAVENOUS ONCE
Status: COMPLETED | OUTPATIENT
Start: 2022-11-30 | End: 2022-11-30

## 2022-11-30 RX ORDER — ONDANSETRON 4 MG/1
4 TABLET, ORALLY DISINTEGRATING ORAL
Status: ON HOLD | COMMUNITY
Start: 2022-11-20 | End: 2022-12-04

## 2022-11-30 RX ORDER — OXYTOCIN 10 [USP'U]/ML
10 INJECTION, SOLUTION INTRAMUSCULAR; INTRAVENOUS
Status: DISCONTINUED | OUTPATIENT
Start: 2022-11-30 | End: 2022-11-30 | Stop reason: HOSPADM

## 2022-11-30 RX ORDER — METOCLOPRAMIDE 10 MG/1
10 TABLET ORAL EVERY 6 HOURS PRN
Status: DISCONTINUED | OUTPATIENT
Start: 2022-11-30 | End: 2022-11-30 | Stop reason: HOSPADM

## 2022-11-30 RX ORDER — ONDANSETRON 4 MG/1
4 TABLET, ORALLY DISINTEGRATING ORAL EVERY 6 HOURS PRN
Status: DISCONTINUED | OUTPATIENT
Start: 2022-11-30 | End: 2022-11-30 | Stop reason: HOSPADM

## 2022-11-30 RX ORDER — OXYTOCIN/0.9 % SODIUM CHLORIDE 30/500 ML
100-340 PLASTIC BAG, INJECTION (ML) INTRAVENOUS CONTINUOUS PRN
Status: DISCONTINUED | OUTPATIENT
Start: 2022-11-30 | End: 2022-11-30 | Stop reason: HOSPADM

## 2022-11-30 RX ORDER — VITAMIN A ACETATE, BETA CAROTENE, ASCORBIC ACID, CHOLECALCIFEROL, .ALPHA.-TOCOPHEROL ACETATE, DL-, THIAMINE MONONITRATE, RIBOFLAVIN, NIACINAMIDE, PYRIDOXINE HYDROCHLORIDE, FOLIC ACID, CYANOCOBALAMIN, CALCIUM CARBONATE, FERROUS FUMARATE, ZINC OXIDE, CUPRIC OXIDE 3080; 12; 120; 400; 1; 1.84; 3; 20; 22; 920; 25; 200; 27; 10; 2 [IU]/1; UG/1; MG/1; [IU]/1; MG/1; MG/1; MG/1; MG/1; MG/1; [IU]/1; MG/1; MG/1; MG/1; MG/1; MG/1
1 TABLET, FILM COATED ORAL DAILY
Status: ON HOLD | COMMUNITY
End: 2022-12-04

## 2022-11-30 RX ORDER — NALOXONE HYDROCHLORIDE 0.4 MG/ML
0.4 INJECTION, SOLUTION INTRAMUSCULAR; INTRAVENOUS; SUBCUTANEOUS
Status: DISCONTINUED | OUTPATIENT
Start: 2022-11-30 | End: 2022-11-30 | Stop reason: HOSPADM

## 2022-11-30 RX ORDER — KETOROLAC TROMETHAMINE 30 MG/ML
30 INJECTION, SOLUTION INTRAMUSCULAR; INTRAVENOUS
Status: DISCONTINUED | OUTPATIENT
Start: 2022-11-30 | End: 2022-11-30 | Stop reason: HOSPADM

## 2022-11-30 RX ORDER — CARBOPROST TROMETHAMINE 250 UG/ML
250 INJECTION, SOLUTION INTRAMUSCULAR
Status: DISCONTINUED | OUTPATIENT
Start: 2022-11-30 | End: 2022-11-30 | Stop reason: HOSPADM

## 2022-11-30 RX ORDER — EPHEDRINE SULFATE 50 MG/ML
5 INJECTION, SOLUTION INTRAMUSCULAR; INTRAVENOUS; SUBCUTANEOUS
Status: DISCONTINUED | OUTPATIENT
Start: 2022-11-30 | End: 2022-11-30 | Stop reason: HOSPADM

## 2022-11-30 RX ORDER — BISACODYL 10 MG
10 SUPPOSITORY, RECTAL RECTAL
Status: ON HOLD | COMMUNITY
Start: 2022-09-06 | End: 2022-12-04

## 2022-11-30 RX ORDER — TRANEXAMIC ACID 10 MG/ML
1 INJECTION, SOLUTION INTRAVENOUS EVERY 30 MIN PRN
Status: DISCONTINUED | OUTPATIENT
Start: 2022-11-30 | End: 2022-11-30 | Stop reason: HOSPADM

## 2022-11-30 RX ORDER — CITRIC ACID/SODIUM CITRATE 334-500MG
30 SOLUTION, ORAL ORAL
Status: DISCONTINUED | OUTPATIENT
Start: 2022-11-30 | End: 2022-11-30 | Stop reason: HOSPADM

## 2022-11-30 RX ORDER — METOCLOPRAMIDE HYDROCHLORIDE 5 MG/ML
10 INJECTION INTRAMUSCULAR; INTRAVENOUS EVERY 6 HOURS PRN
Status: DISCONTINUED | OUTPATIENT
Start: 2022-11-30 | End: 2022-11-30 | Stop reason: HOSPADM

## 2022-11-30 RX ORDER — MISOPROSTOL 200 UG/1
400 TABLET ORAL
Status: DISCONTINUED | OUTPATIENT
Start: 2022-11-30 | End: 2022-11-30 | Stop reason: HOSPADM

## 2022-11-30 RX ORDER — LIDOCAINE 40 MG/G
CREAM TOPICAL
Status: DISCONTINUED | OUTPATIENT
Start: 2022-11-30 | End: 2022-11-30 | Stop reason: HOSPADM

## 2022-11-30 RX ORDER — METHYLERGONOVINE MALEATE 0.2 MG/ML
200 INJECTION INTRAVENOUS
Status: DISCONTINUED | OUTPATIENT
Start: 2022-11-30 | End: 2022-11-30 | Stop reason: HOSPADM

## 2022-11-30 RX ORDER — PROCHLORPERAZINE 25 MG
25 SUPPOSITORY, RECTAL RECTAL EVERY 12 HOURS PRN
Status: DISCONTINUED | OUTPATIENT
Start: 2022-11-30 | End: 2022-11-30 | Stop reason: HOSPADM

## 2022-11-30 RX ORDER — IBUPROFEN 800 MG/1
800 TABLET, FILM COATED ORAL
Status: DISCONTINUED | OUTPATIENT
Start: 2022-11-30 | End: 2022-11-30 | Stop reason: HOSPADM

## 2022-11-30 RX ORDER — FENTANYL CITRATE 50 UG/ML
100 INJECTION, SOLUTION INTRAMUSCULAR; INTRAVENOUS
Status: DISCONTINUED | OUTPATIENT
Start: 2022-11-30 | End: 2022-11-30 | Stop reason: HOSPADM

## 2022-11-30 RX ORDER — NALBUPHINE HYDROCHLORIDE 10 MG/ML
2.5-5 INJECTION, SOLUTION INTRAMUSCULAR; INTRAVENOUS; SUBCUTANEOUS EVERY 6 HOURS PRN
Status: DISCONTINUED | OUTPATIENT
Start: 2022-11-30 | End: 2022-11-30 | Stop reason: HOSPADM

## 2022-11-30 RX ORDER — MISOPROSTOL 200 UG/1
800 TABLET ORAL
Status: DISCONTINUED | OUTPATIENT
Start: 2022-11-30 | End: 2022-11-30 | Stop reason: HOSPADM

## 2022-11-30 RX ORDER — SODIUM CHLORIDE, SODIUM LACTATE, POTASSIUM CHLORIDE, CALCIUM CHLORIDE 600; 310; 30; 20 MG/100ML; MG/100ML; MG/100ML; MG/100ML
INJECTION, SOLUTION INTRAVENOUS CONTINUOUS
Status: DISCONTINUED | OUTPATIENT
Start: 2022-11-30 | End: 2022-11-30 | Stop reason: HOSPADM

## 2022-11-30 RX ADMIN — PENICILLIN G 3 MILLION UNITS: 3000000 INJECTION, SOLUTION INTRAVENOUS at 09:37

## 2022-11-30 RX ADMIN — PENICILLIN G 3 MILLION UNITS: 3000000 INJECTION, SOLUTION INTRAVENOUS at 13:37

## 2022-11-30 RX ADMIN — SODIUM CHLORIDE, POTASSIUM CHLORIDE, SODIUM LACTATE AND CALCIUM CHLORIDE: 600; 310; 30; 20 INJECTION, SOLUTION INTRAVENOUS at 05:50

## 2022-11-30 RX ADMIN — FENTANYL CITRATE 100 MCG: 50 INJECTION, SOLUTION INTRAMUSCULAR; INTRAVENOUS at 08:54

## 2022-11-30 RX ADMIN — PENICILLIN G POTASSIUM 5 MILLION UNITS: 5000000 POWDER, FOR SOLUTION INTRAMUSCULAR; INTRAPLEURAL; INTRATHECAL; INTRAVENOUS at 05:56

## 2022-11-30 RX ADMIN — FENTANYL CITRATE 100 MCG: 50 INJECTION, SOLUTION INTRAMUSCULAR; INTRAVENOUS at 05:53

## 2022-11-30 ASSESSMENT — ACTIVITIES OF DAILY LIVING (ADL)
ADLS_ACUITY_SCORE: 18

## 2022-11-30 NOTE — PLAN OF CARE
Patient asking questions about leaving AMA as she is not feeling contractions, moderate to marked variability.   Monitor tracing showing occasional variable decelerations.  Showed these to patient and requested that she remain for monitoring.    She consents at this time.   Li Short RN on 11/30/2022 at 3:58 PM

## 2022-11-30 NOTE — PROVIDER NOTIFICATION
11/30/22 3886   Provider Notification   Provider Name/Title Dr lamar   Method of Notification At Bedside   Request Evaluate in Person   Notification Reason Status Update  (discussing AMA, offered to stay overnight with monitoring and do low-dose pitocin when DrBagan is on at 0700. patient expresses desire to leave; she has a doppler machine to check FHT's at home and wants to monitor for symptoms. NST/appt scheduled 12/1.)

## 2022-11-30 NOTE — PLAN OF CARE
"Data: Patient admitted to [facility] at 0420. Patient is a . Prenatal record reviewed. Hx of:  in May 2021, left salpingo-oophrectomy. GBS+  OB History    Para Term  AB Living   2 1 1 0 0 1   SAB IAB Ectopic Multiple Live Births   0 0 0 0 1      # Outcome Date GA Lbr Alexandro/2nd Weight Sex Delivery Anes PTL Lv   2 Current            1 Term 21 41w1d  3.09 kg (6 lb 13 oz) M CS-LTranv EPI N PAULA      Name: NINA TERESA-MATHIEU      Apgar1: 8  Apgar5: 9   .  Medical History:   Past Medical History:   Diagnosis Date     Uncomplicated asthma    .  Gestational age 42w4d. Vital signs per doc flowsheet. Fetal movement present. Patient reports Rule Out Labor   as reason for assessment. +FM. Contractions started at \"0148 this morning and have been every 2 minutes since they started.\" Denies LOF, bleeding, other OB concerns. Support persons not present.    Action: Verbal consent for EFM, external fetal monitors applied. Triage assessment completed. SVE 3/90/0 - +1. Patient and support persons educated on labor process. Patient instructed to report change in fetal movement, contractions, vaginal leaking of fluid or bleeding, abdominal pain, or any concerns related to the pregnancy to her nurse/physician. Patient oriented to room, call light in reach.     Response: Dr. Wolf informed of patient assessment. Plan per provider is admit to L&D, pre-e labs w/ urine, start PCN treatment for gbs. Patient verbalized understanding of education and verbalized agreement with plan.     "

## 2022-11-30 NOTE — PROVIDER NOTIFICATION
11/30/22 1258   Provider Notification   Provider Name/Title Dr Robledo   Method of Notification At Bedside   Request Evaluate in Person   Notification Reason Status Update;Other (Comment)  (discuss plan)     Reviewing patient history, discussing options and risks of continuing to labor given 18 month interpregnancy interval and strength of uterine scar.  Reviewed risks including uterine rupture and sequelae of potential bleeding, hysterectomy and fetal compromise.  Acknowledged emotional trauma of previous delivery.  Also discussed risk/use of oxytocin for augmentation and offered amniotomy.   Dr does not recommend use of oxytocin at this time.  Given opportunity to ask questions.   Patient desires to defer amniotomy, asking questions about low-dose oxytocin per previous discussion with  Dr Ramos.     Discussed risk of infection with amniotomy.   Patient also asking about discharge and return for induction on  12/2 as previously scheduled. Provider reviewed risks of continuing pregnancy at 42 weeks including placental aging and elevated fetal risk.   Discussed recommendation of epidural during active labor to avoid general anesthesia if CS becomes necessary, recommend to discuss with anesthesia.   Willing to continue to observe, have patient walk and reassess; patient consents to this.  Continue PCN for GBS prophylaxis at this time.                   Li Short RN on 11/30/2022 at 1:33 PM

## 2022-11-30 NOTE — PROGRESS NOTES
"[facility]  Labor Progress Note    S: In to meet patient. She reports her contractions started spacing out around 10:30 and then around 11AM they stopped. She had been theresa painfully since around 1AM before coming in around 4AM to L&D triage and was admitted with painful regular contractions and cervical change from baseline earlier this week from 1cm>3cm. She had fentanyl dose prior to contractions spacing during this admission. Since then she has not had contractions. I did offer attempt for AROM and she declined stating she would likely accept with active labor.     We then had a long conversation about her past surgical Hx including her pfannenstiel incision for LSO (path result benign dermoid cyst) in 2020 and her primary LTCS emergent  in 2021 for G1 pregnancy. We discussed that her pregnancy in  represents a short interval pregnancy and that typically this can greatly increase risk of uterine rupture. With her interval being around 8 months, this is between 6-12 months where 12-18 months is recommended for safest practice for managing TOLAC. With her short interval pregnancy being >6 months after surgery but <12 months after surgery, her risk of uterine rupture with pitocin may be approaching an undesirable level and so I would not offer pitocin for induction. I again offered AROM and she declined without early labor. She requested discharge home without labor as she declines  as well. We discussed she has right of autonomy and can elope or leave against medical advice. However, with her 42 week GA, I strongly recommended she stay in hospital due to risk of IUFD. She states she had an understanding with Dr. Ramos who scheduled an IOL with patient on Friday when she was on call to offer pitocin for IOL. I read Dr. Ramos's note from 22 and her plan is as follows,     \"Had extensive conversation about risks of post-term pregnancy including risk of stillbirth, " "increased risk of CS, meconium aspiration, post maturity syndrome, NICU admission, risk of STAT CS. She is worried about the risk of CS and strongly desires TOLAC. Discussed that chance of  is higher with IOL and recommended staying for IOL today. She declines but was amenable to membrane stripping which was performed. With how low and well applied the fetal head is to the cervix dicussed nino balloon may not be the best option and rather pitocin IOL may be recommended. However, this would be up to the physician on-call. Again recommended that she stay for IOL and she declines. She has a follow-up appointment on Thursday this week. She is agreeable to scheduling an IOL on Friday morning. Discussed if she changes her mind and wants to come in sooner for an IOL I would recommend that. Discussed that if she feels decreased FM she needs to return to L&D.\"    I reiterated that I do not feel comfortable offering pitocin with her short interval pregnancy. Patient expressed understanding. We discussed that with her post-term pregnancy, I do not recommend going home to await IOL with Dr. Ramos on Friday, particularly for her increasing risk of IUFD. Patient agreed that this risk is dangerous and will plan to stay. Our next step will be allowing patient to ambulate to see if contractions and labor returns. She can have AROM for IOL at any time with me. Patient will plan for consenting to AROM once she is in active labor. This is a reasonable plan. We will do intermittent monitoring provided patient has +FM, not in labor, while patient is ambulating. Will plan on NST and/or continuous monitoring when patient not ambulating. Will continue PNC in case of active labor during my 24hr shift, as will need on board for safe AROM with her GBS+ status.     Currently, patient reports +FM, no ctx, denies VB, LOF.     O:   /87   Pulse 92   Temp 97.9  F (36.6  C) (Oral)   Resp 18   SpO2 99%   SVE: 3/90/0 ~9AM by " RN    FHT: 130/mod/+a/-d, Cat 1  [place]: Ctx infrequently    A/P:  Ms. Cinthya Ang is a 32 year old  at 42w4d, here early labor, desiring TOLAC with short interval pregnancy, GBS+, postterm pregnancy, Hx LTCS x1, Hx laparotomy for LSO.    Labor:   - Early labor, now quiescent   - S/p x2 doses of PCN, continue  - AROM declined  - Pitocin not offered due to short interval pregnancy  - Elevated BP on admission, since wnl, with neg HELLP and pr/cr   - Epidural encouraged with active labor and/or s/p ROM    FWB:   - Category 1 FHT    PNC:   - Rh +, Rubella immune, GBS +, GCT passed, Placenta posterior    Other:   - COVID screen neg  - Hgb on admission 13.3  - Cephalic on  BBP US

## 2022-11-30 NOTE — PLAN OF CARE
Contractions have decreased, patient is comfortable, FHT's Category 1.    Patient wishes to defer amniotomy at this time and is open to low-dose pitocin.  Will discuss with Dr Robledo when he rounds on patient.   Li Short RN on 11/30/2022 at 12:47 PM

## 2022-11-30 NOTE — PROVIDER NOTIFICATION
11/30/22 1733   Provider Notification   Provider Name/Title Dr Robledo   Method of Notification Phone   Request Evaluate in Person   Notification Reason Other (Comment)  (patient desires to leave AMA.  Provider coming to discuss with patient)   Li Short RN on 11/30/2022 at 5:35 PM

## 2022-11-30 NOTE — PROVIDER NOTIFICATION
11/30/22 0800   Provider Notification   Provider Name/Title Dr Robledo   Method of Notification In Department   Notification Reason Status Update;Uterine Activity;Labor Status   Discussed FHT's, patient history (short interval pregnancy, Pts strong desire to avoid a repeat CS), and plan.   Provider remains on unit and will discuss plan with patient.      Li Short RN on 11/30/2022 at 8:08 AM

## 2022-11-30 NOTE — H&P
[facility]     History and Physical  Obstetrics and Gynecology     Date of Admission:  2022    History of Present Illness   Cinthya Ang is a 32 year old female  42w4d with Estimated Date of Delivery: 2022 who presents with contractions    [facility].    PRENATAL COURSE  Prenatal care was received at [facility] and the  course was complicated by post dates, prior  for fetal indications in 2021, obesity.        Recent Labs   Lab Test 21   ABO O   RH Pos     Rhogam not indicated   Recent Labs   Lab Test 21  0000 10/12/20  0000   HEPBANG  --  negative   HIAGAB  --  negative   GBS positive  --        Past Medical History    Past medical history reviewed      Past Surgical History   Past surgical history reviewed      Prior to Admission Medications   Prior to Admission Medications   Prescriptions Last Dose Informant Patient Reported? Taking?   Prenatal Vit-Fe Fumarate-FA (PRENATAL MULTIVITAMIN W/IRON) 27-0.8 MG tablet   Yes No   Sig: Take 1 tablet by mouth daily   Prenatal Vit-Fe Fumarate-FA (PRENATAL PLUS) 27-1 MG TABS   Yes No   Sig: Take 1 tablet by mouth daily   acetaminophen (TYLENOL) 325 MG tablet   No No   Sig: Take 1-2 tablets (325-650 mg) by mouth every 6 hours as needed for mild pain   aspirin (ASA) 81 MG chewable tablet   Yes No   Sig: Take 81 mg by mouth daily   bisacodyl (DULCOLAX) 10 MG suppository   Yes Yes   Sig: Place 10 mg rectally   ibuprofen (ADVIL/MOTRIN) 800 MG tablet   No No   Sig: Take 1 tablet (800 mg) by mouth every 6 hours   ondansetron (ZOFRAN ODT) 4 MG ODT tab   Yes Yes   Sig: Take 4 mg by mouth   senna-docusate (SENOKOT-S/PERICOLACE) 8.6-50 MG tablet   No No   Sig: Take 1 tablet by mouth 2 times daily as needed for constipation      Facility-Administered Medications: None     Allergies   No Known Allergies    Social History   I have personally  reviewed the social history      Family History   Family history reviewed with patient and is noncontributory.    Immunization History   - Declined flu, Tdap 22  - s/p Covid vaccine       Physical Exam   Temp: 98.8  F (37.1  C) Temp src: Oral BP: (!) 143/96     Resp: 20        Vital Signs with Ranges  Temp:  [98.8  F (37.1  C)] 98.8  F (37.1  C)  Resp:  [20] 20  BP: (143-144)/(91-96) 143/96  Fetal Heart Tones: 130 baseline, moderate variablility, + accels, one late decel and Category II  TOCO: frequency q 2-4 minutes    Constitutional: healthy, alert and active   Respiratory: No increased work of breathing, good air exchange, clear to auscultation bilaterally, no crackles or wheezing  Cardiovascular: Normal apical impulse, regular rate and rhythm, normal S1 and S2, no S3 or S4, and no murmur noted  Abdomen: gravid, single vertex fetus, non-tender, EFW 8 lbs 0  Cervical Exam: 3/ 90/ Mid/ soft/ 0       Assessment & Plan   Cinthya Ang is a 32 year old female  who presents at 42w4d with contractions and is agreeable to stay for augmentation if needed due to post dates.   GBS positive.     Admit - see IP orders  Anticipate     Elevated BP on admission and will check labs  -continue to monitor BP (has been normal prior to admission)    Monika Wolf, DO, DO

## 2022-11-30 NOTE — PROVIDER NOTIFICATION
11/30/22 1625   Provider Notification   Provider Name/Title Dr Robledo   Method of Notification Phone   Request Evaluate - Remote   Dr called in for update:  informed re: category 2 tracing  moderate to marked variability with occasional variable decelerations without contractions and patient desire to go home..   Order to maintain continuous monitoring and call if patient is intending to leave.     Li Short RN on 11/30/2022 at 4:29 PM

## 2022-11-30 NOTE — PROVIDER NOTIFICATION
11/30/22 0445   Provider Notification   Provider Name/Title Dr. Wolf   Method of Notification In Department   Request Evaluate - Remote   Notification Reason Patient Arrived;SVE;Labor Status

## 2022-11-30 NOTE — PROVIDER NOTIFICATION
11/30/22 1130   Provider Notification   Provider Name/Title    Method of Notification In Department   Notification Reason Labor Status;Uterine Activity;SVE;Status Update  (updated re: uterine activity and SVE.  Will see patient and discuss plan.)   Li Short RN on 11/30/2022 at 11:37 AM

## 2022-12-01 NOTE — DISCHARGE SUMMARY
[facility]    Discharge Summary  Obstetrics    Date of Admission:  2022  Date of Discharge:  2022  Discharging Provider: Elmo Robledo MD    Discharge Diagnoses   - 42 week postterm pregnancy  - Hx of  x1  - Short interval pregnancy (last delivery 2021)  - Elevated BP w/o Dx of HTN  - GBS+  - Obesity Class I  - Hx LSO with ex-lap (2020)    History of Present Illness   Cinthya Ang is a 32 year old female now  who presented with early labor with cervical change from baseline earlier this week and regular painful ctx. She is 42 weeks pregnant and had declined both  and IOL prior to this point including as recently as 22. At that visit, patient did accept back up IOL on 22.    Hospital Course   The patient's labor and contractions stopped late morning. She received x2 dose of PNC. AROM offered and declined. Patient requested pitocin. While other providers may offer pitocin, the decision for providers to offer is an individualized decision. She has MFM rate of success >60%. However, she has risk factors for decreased risk of success and more specifically uterine rupture with pitocin including but not limited to >40 week GA, need of IOL, pitocin, short interval pregnancy. We discussed her pregnancy interval is >6 months <12 months (~8 months) and her inter-delivery interval is ~18 months. There are varying studies on risk of rupture but by my assessment with her presentation, she may be approaching 2-3% risk with pitocin. I informed her there are different studies and definitions and not national protocols determining absolute safety of pitocin use in her case and that the decision is provider dependent. She expressed understanding of this and requested discharge home. I informed her my recommendation for her is to stay for AROM or continuous fetal monitoring as her risk of IUFD >42 weeks GA is significantly elevated. Patient originally  accepted continued management and walked intermittently to see if ctx/labor emerged. Around 5pm w/o ctx nor labor, she again requested discharge home. I explained to her again her patient autonomy. We re-discussed her risks of TOLAC, , uterine rupture, AROM, IUFD, postterm pregnancy. She again requested discharge and was willing to sign AMA paperwork after my recommendation for BPP. Her BPP was 8/8 with vertex presentation. Her NST prior to discharge was reassuring Cat II reactive with multiple accelerations and moderate variability with intermittent decelerations. Her overall BPP was 10/10. Patient felt reassured, and utilized autonomy to leave AMA thereafter. She was encouraged to return for admission at any time with her postterm pregnancy as delivery is recommended. We discussed if signs of changes or decreased FM, regular ctx, severe abdominal pain, VB, LOF for patient to also come back to L&D. All questions answered. Patient plans to go to her previously scheduled antepartum visit tomorrow 22. That provider was updated of patient's discharge against medical advice and plan to come to appointment tomorrow in office.     During patient's admission she had isolated non-severe range elevated BP's w/o Dx of HTN, no s/stx's of pre-e with negative HELLP labs and urine pr/cr wnl.    Her COVID screen today was negative. And if she ultimately decides to present 22 for IOL, she will not require repeat screening; test only if symptomatic.    Of note: her previous  in 2021 reportedly was closed with 2 layer closure, no mention of significant scarring nor ahesions was performed due after epidural at 2cm dilation due to bradycardia as a stat LTCS under general anesthesia. Her 2020 LSO was performed with a low transverse ex-lap incision ultimately for dermoid cyst w/o noted complication.    Hgb:   Lab Results   Component Value Date    HGB 13.3 2022    HGB 11.1 2021       Lab  Results   Component Value Date    RH Pos 05/19/2021     Instructions: As above.    Elmo Robledo MD    Discharge Disposition   Discharged to home, against medical advice  Condition at discharge: Stable    Primary Care Physician   Physician No Ref-Primary    Consultations This Hospital Stay   ANESTHESIOLOGY IP CONSULT    Discharge Orders   No discharge procedures on file.  Discharge Medications   Discharge Medication List as of 11/30/2022  6:48 PM      CONTINUE these medications which have NOT CHANGED    Details   acetaminophen (TYLENOL) 325 MG tablet Take 1-2 tablets (325-650 mg) by mouth every 6 hours as needed for mild pain, Disp-60 tablet, R-1, E-Prescribe      aspirin (ASA) 81 MG chewable tablet Take 81 mg by mouth daily, Historical      bisacodyl (DULCOLAX) 10 MG suppository Place 10 mg rectally, Historical      ibuprofen (ADVIL/MOTRIN) 800 MG tablet Take 1 tablet (800 mg) by mouth every 6 hours, Disp-60 tablet, R-1, E-Prescribe      ondansetron (ZOFRAN ODT) 4 MG ODT tab Take 4 mg by mouth, Historical      Prenatal Vit-Fe Fumarate-FA (PRENATAL MULTIVITAMIN W/IRON) 27-0.8 MG tablet Take 1 tablet by mouth daily, Historical      Prenatal Vit-Fe Fumarate-FA (PRENATAL PLUS) 27-1 MG TABS Take 1 tablet by mouth daily, Historical      senna-docusate (SENOKOT-S/PERICOLACE) 8.6-50 MG tablet Take 1 tablet by mouth 2 times daily as needed for constipation, Disp-30 tablet, R-1, E-Prescribe           Allergies   No Known Allergies

## 2022-12-01 NOTE — PLAN OF CARE
BPP 8/8.  Patient will leave AMA.  Paper signed.   She states she will return for decreased fetal movement, increased contractions, leaking fluid or bleeding or other concerns.   She is scheduled at 10am with Dr Wolf tomorrow and on the schedule for induction on 12/2. Discharged home ambulatory and stable against provider recommendation.     Li Short RN on 11/30/2022 at 6:44 PM

## 2022-12-02 ENCOUNTER — HOSPITAL ENCOUNTER (INPATIENT)
Facility: CLINIC | Age: 32
LOS: 2 days | Discharge: HOME | End: 2022-12-04
Attending: STUDENT IN AN ORGANIZED HEALTH CARE EDUCATION/TRAINING PROGRAM | Admitting: STUDENT IN AN ORGANIZED HEALTH CARE EDUCATION/TRAINING PROGRAM
Payer: COMMERCIAL

## 2022-12-02 DIAGNOSIS — Z98.891 S/P PRIMARY LOW TRANSVERSE C-SECTION: ICD-10-CM

## 2022-12-02 DIAGNOSIS — O34.219 VBAC (VAGINAL BIRTH AFTER CESAREAN): Primary | ICD-10-CM

## 2022-12-02 PROCEDURE — 250N000009 HC RX 250: Performed by: STUDENT IN AN ORGANIZED HEALTH CARE EDUCATION/TRAINING PROGRAM

## 2022-12-02 PROCEDURE — 250N000011 HC RX IP 250 OP 636: Performed by: ANESTHESIOLOGY

## 2022-12-02 PROCEDURE — 10907ZC DRAINAGE OF AMNIOTIC FLUID, THERAPEUTIC FROM PRODUCTS OF CONCEPTION, VIA NATURAL OR ARTIFICIAL OPENING: ICD-10-PCS | Performed by: STUDENT IN AN ORGANIZED HEALTH CARE EDUCATION/TRAINING PROGRAM

## 2022-12-02 PROCEDURE — 00HU33Z INSERTION OF INFUSION DEVICE INTO SPINAL CANAL, PERCUTANEOUS APPROACH: ICD-10-PCS | Performed by: ANESTHESIOLOGY

## 2022-12-02 PROCEDURE — 722N000001 HC LABOR CARE VAGINAL DELIVERY SINGLE

## 2022-12-02 PROCEDURE — 0DQR0ZZ REPAIR ANAL SPHINCTER, OPEN APPROACH: ICD-10-PCS | Performed by: STUDENT IN AN ORGANIZED HEALTH CARE EDUCATION/TRAINING PROGRAM

## 2022-12-02 PROCEDURE — 250N000011 HC RX IP 250 OP 636

## 2022-12-02 PROCEDURE — 3E0R3BZ INTRODUCTION OF ANESTHETIC AGENT INTO SPINAL CANAL, PERCUTANEOUS APPROACH: ICD-10-PCS | Performed by: ANESTHESIOLOGY

## 2022-12-02 PROCEDURE — 120N000001 HC R&B MED SURG/OB

## 2022-12-02 PROCEDURE — 370N000003 HC ANESTHESIA WARD SERVICE

## 2022-12-02 PROCEDURE — 258N000003 HC RX IP 258 OP 636: Performed by: STUDENT IN AN ORGANIZED HEALTH CARE EDUCATION/TRAINING PROGRAM

## 2022-12-02 PROCEDURE — 3E033VJ INTRODUCTION OF OTHER HORMONE INTO PERIPHERAL VEIN, PERCUTANEOUS APPROACH: ICD-10-PCS | Performed by: STUDENT IN AN ORGANIZED HEALTH CARE EDUCATION/TRAINING PROGRAM

## 2022-12-02 PROCEDURE — 250N000011 HC RX IP 250 OP 636: Performed by: STUDENT IN AN ORGANIZED HEALTH CARE EDUCATION/TRAINING PROGRAM

## 2022-12-02 PROCEDURE — 250N000013 HC RX MED GY IP 250 OP 250 PS 637: Performed by: STUDENT IN AN ORGANIZED HEALTH CARE EDUCATION/TRAINING PROGRAM

## 2022-12-02 RX ORDER — NALOXONE HYDROCHLORIDE 0.4 MG/ML
0.2 INJECTION, SOLUTION INTRAMUSCULAR; INTRAVENOUS; SUBCUTANEOUS
Status: DISCONTINUED | OUTPATIENT
Start: 2022-12-02 | End: 2022-12-02 | Stop reason: HOSPADM

## 2022-12-02 RX ORDER — ACETAMINOPHEN 325 MG/1
650 TABLET ORAL EVERY 4 HOURS PRN
Status: DISCONTINUED | OUTPATIENT
Start: 2022-12-02 | End: 2022-12-04 | Stop reason: HOSPADM

## 2022-12-02 RX ORDER — TRANEXAMIC ACID 10 MG/ML
1 INJECTION, SOLUTION INTRAVENOUS EVERY 30 MIN PRN
Status: DISCONTINUED | OUTPATIENT
Start: 2022-12-02 | End: 2022-12-02 | Stop reason: HOSPADM

## 2022-12-02 RX ORDER — METHYLERGONOVINE MALEATE 0.2 MG/ML
200 INJECTION INTRAVENOUS
Status: DISCONTINUED | OUTPATIENT
Start: 2022-12-02 | End: 2022-12-04 | Stop reason: HOSPADM

## 2022-12-02 RX ORDER — FENTANYL/BUPIVACAINE/NS/PF 2-1250MCG
PLASTIC BAG, INJECTION (ML) INJECTION
Status: COMPLETED
Start: 2022-12-02 | End: 2022-12-02

## 2022-12-02 RX ORDER — PENICILLIN G POTASSIUM 5000000 [IU]/1
5 INJECTION, POWDER, FOR SOLUTION INTRAMUSCULAR; INTRAVENOUS ONCE
Status: COMPLETED | OUTPATIENT
Start: 2022-12-02 | End: 2022-12-02

## 2022-12-02 RX ORDER — OXYTOCIN 10 [USP'U]/ML
10 INJECTION, SOLUTION INTRAMUSCULAR; INTRAVENOUS
Status: DISCONTINUED | OUTPATIENT
Start: 2022-12-02 | End: 2022-12-04 | Stop reason: HOSPADM

## 2022-12-02 RX ORDER — OXYTOCIN/0.9 % SODIUM CHLORIDE 30/500 ML
100-340 PLASTIC BAG, INJECTION (ML) INTRAVENOUS CONTINUOUS PRN
Status: DISCONTINUED | OUTPATIENT
Start: 2022-12-02 | End: 2022-12-04 | Stop reason: HOSPADM

## 2022-12-02 RX ORDER — SODIUM CHLORIDE, SODIUM LACTATE, POTASSIUM CHLORIDE, CALCIUM CHLORIDE 600; 310; 30; 20 MG/100ML; MG/100ML; MG/100ML; MG/100ML
INJECTION, SOLUTION INTRAVENOUS CONTINUOUS PRN
Status: DISCONTINUED | OUTPATIENT
Start: 2022-12-02 | End: 2022-12-02 | Stop reason: HOSPADM

## 2022-12-02 RX ORDER — IBUPROFEN 800 MG/1
800 TABLET, FILM COATED ORAL EVERY 6 HOURS PRN
Status: DISCONTINUED | OUTPATIENT
Start: 2022-12-02 | End: 2022-12-04 | Stop reason: HOSPADM

## 2022-12-02 RX ORDER — METOCLOPRAMIDE 10 MG/1
10 TABLET ORAL EVERY 6 HOURS PRN
Status: DISCONTINUED | OUTPATIENT
Start: 2022-12-02 | End: 2022-12-02 | Stop reason: HOSPADM

## 2022-12-02 RX ORDER — PENICILLIN G 3000000 [IU]/50ML
3 INJECTION, SOLUTION INTRAVENOUS EVERY 4 HOURS
Status: DISCONTINUED | OUTPATIENT
Start: 2022-12-02 | End: 2022-12-02 | Stop reason: HOSPADM

## 2022-12-02 RX ORDER — ONDANSETRON 2 MG/ML
4 INJECTION INTRAMUSCULAR; INTRAVENOUS EVERY 6 HOURS PRN
Status: DISCONTINUED | OUTPATIENT
Start: 2022-12-02 | End: 2022-12-02 | Stop reason: HOSPADM

## 2022-12-02 RX ORDER — MISOPROSTOL 200 UG/1
800 TABLET ORAL
Status: DISCONTINUED | OUTPATIENT
Start: 2022-12-02 | End: 2022-12-02 | Stop reason: HOSPADM

## 2022-12-02 RX ORDER — DOCUSATE SODIUM 100 MG/1
100 CAPSULE, LIQUID FILLED ORAL 2 TIMES DAILY
Status: DISCONTINUED | OUTPATIENT
Start: 2022-12-02 | End: 2022-12-04 | Stop reason: HOSPADM

## 2022-12-02 RX ORDER — FENTANYL CITRATE 50 UG/ML
100 INJECTION, SOLUTION INTRAMUSCULAR; INTRAVENOUS
Status: DISCONTINUED | OUTPATIENT
Start: 2022-12-02 | End: 2022-12-02 | Stop reason: HOSPADM

## 2022-12-02 RX ORDER — OXYTOCIN/0.9 % SODIUM CHLORIDE 30/500 ML
1-24 PLASTIC BAG, INJECTION (ML) INTRAVENOUS CONTINUOUS
Status: DISCONTINUED | OUTPATIENT
Start: 2022-12-02 | End: 2022-12-02 | Stop reason: HOSPADM

## 2022-12-02 RX ORDER — OXYTOCIN 10 [USP'U]/ML
10 INJECTION, SOLUTION INTRAMUSCULAR; INTRAVENOUS
Status: DISCONTINUED | OUTPATIENT
Start: 2022-12-02 | End: 2022-12-02 | Stop reason: HOSPADM

## 2022-12-02 RX ORDER — BUPIVACAINE HYDROCHLORIDE 2.5 MG/ML
INJECTION, SOLUTION EPIDURAL; INFILTRATION; INTRACAUDAL
Status: COMPLETED | OUTPATIENT
Start: 2022-12-02 | End: 2022-12-02

## 2022-12-02 RX ORDER — NALBUPHINE HYDROCHLORIDE 10 MG/ML
2.5-5 INJECTION, SOLUTION INTRAMUSCULAR; INTRAVENOUS; SUBCUTANEOUS EVERY 6 HOURS PRN
Status: DISCONTINUED | OUTPATIENT
Start: 2022-12-02 | End: 2022-12-04 | Stop reason: HOSPADM

## 2022-12-02 RX ORDER — OXYTOCIN/0.9 % SODIUM CHLORIDE 30/500 ML
340 PLASTIC BAG, INJECTION (ML) INTRAVENOUS CONTINUOUS PRN
Status: DISCONTINUED | OUTPATIENT
Start: 2022-12-02 | End: 2022-12-02 | Stop reason: HOSPADM

## 2022-12-02 RX ORDER — CITRIC ACID/SODIUM CITRATE 334-500MG
30 SOLUTION, ORAL ORAL
Status: DISCONTINUED | OUTPATIENT
Start: 2022-12-02 | End: 2022-12-02 | Stop reason: HOSPADM

## 2022-12-02 RX ORDER — EPHEDRINE SULFATE 50 MG/ML
5 INJECTION, SOLUTION INTRAMUSCULAR; INTRAVENOUS; SUBCUTANEOUS
Status: DISCONTINUED | OUTPATIENT
Start: 2022-12-02 | End: 2022-12-02 | Stop reason: HOSPADM

## 2022-12-02 RX ORDER — MISOPROSTOL 200 UG/1
400 TABLET ORAL
Status: DISCONTINUED | OUTPATIENT
Start: 2022-12-02 | End: 2022-12-02 | Stop reason: HOSPADM

## 2022-12-02 RX ORDER — SODIUM CHLORIDE, SODIUM LACTATE, POTASSIUM CHLORIDE, CALCIUM CHLORIDE 600; 310; 30; 20 MG/100ML; MG/100ML; MG/100ML; MG/100ML
INJECTION, SOLUTION INTRAVENOUS CONTINUOUS
Status: DISCONTINUED | OUTPATIENT
Start: 2022-12-02 | End: 2022-12-02 | Stop reason: HOSPADM

## 2022-12-02 RX ORDER — CARBOPROST TROMETHAMINE 250 UG/ML
250 INJECTION, SOLUTION INTRAMUSCULAR
Status: DISCONTINUED | OUTPATIENT
Start: 2022-12-02 | End: 2022-12-04 | Stop reason: HOSPADM

## 2022-12-02 RX ORDER — CARBOPROST TROMETHAMINE 250 UG/ML
250 INJECTION, SOLUTION INTRAMUSCULAR
Status: DISCONTINUED | OUTPATIENT
Start: 2022-12-02 | End: 2022-12-02 | Stop reason: HOSPADM

## 2022-12-02 RX ORDER — IBUPROFEN 800 MG/1
800 TABLET, FILM COATED ORAL
Status: DISCONTINUED | OUTPATIENT
Start: 2022-12-02 | End: 2022-12-02

## 2022-12-02 RX ORDER — NALOXONE HYDROCHLORIDE 0.4 MG/ML
0.4 INJECTION, SOLUTION INTRAMUSCULAR; INTRAVENOUS; SUBCUTANEOUS
Status: DISCONTINUED | OUTPATIENT
Start: 2022-12-02 | End: 2022-12-02 | Stop reason: HOSPADM

## 2022-12-02 RX ORDER — METHYLERGONOVINE MALEATE 0.2 MG/ML
200 INJECTION INTRAVENOUS
Status: DISCONTINUED | OUTPATIENT
Start: 2022-12-02 | End: 2022-12-02 | Stop reason: HOSPADM

## 2022-12-02 RX ORDER — PROCHLORPERAZINE MALEATE 10 MG
10 TABLET ORAL EVERY 6 HOURS PRN
Status: DISCONTINUED | OUTPATIENT
Start: 2022-12-02 | End: 2022-12-02 | Stop reason: HOSPADM

## 2022-12-02 RX ORDER — KETOROLAC TROMETHAMINE 30 MG/ML
30 INJECTION, SOLUTION INTRAMUSCULAR; INTRAVENOUS
Status: DISCONTINUED | OUTPATIENT
Start: 2022-12-02 | End: 2022-12-02

## 2022-12-02 RX ORDER — HYDROCORTISONE 25 MG/G
CREAM TOPICAL 3 TIMES DAILY PRN
Status: DISCONTINUED | OUTPATIENT
Start: 2022-12-02 | End: 2022-12-04 | Stop reason: HOSPADM

## 2022-12-02 RX ORDER — TRANEXAMIC ACID 10 MG/ML
1 INJECTION, SOLUTION INTRAVENOUS EVERY 30 MIN PRN
Status: DISCONTINUED | OUTPATIENT
Start: 2022-12-02 | End: 2022-12-04 | Stop reason: HOSPADM

## 2022-12-02 RX ORDER — OXYTOCIN/0.9 % SODIUM CHLORIDE 30/500 ML
340 PLASTIC BAG, INJECTION (ML) INTRAVENOUS CONTINUOUS PRN
Status: DISCONTINUED | OUTPATIENT
Start: 2022-12-02 | End: 2022-12-04 | Stop reason: HOSPADM

## 2022-12-02 RX ORDER — METOCLOPRAMIDE HYDROCHLORIDE 5 MG/ML
10 INJECTION INTRAMUSCULAR; INTRAVENOUS EVERY 6 HOURS PRN
Status: DISCONTINUED | OUTPATIENT
Start: 2022-12-02 | End: 2022-12-02 | Stop reason: HOSPADM

## 2022-12-02 RX ORDER — MISOPROSTOL 200 UG/1
400 TABLET ORAL
Status: DISCONTINUED | OUTPATIENT
Start: 2022-12-02 | End: 2022-12-04 | Stop reason: HOSPADM

## 2022-12-02 RX ORDER — MISOPROSTOL 200 UG/1
800 TABLET ORAL
Status: DISCONTINUED | OUTPATIENT
Start: 2022-12-02 | End: 2022-12-04 | Stop reason: HOSPADM

## 2022-12-02 RX ORDER — PROCHLORPERAZINE 25 MG
25 SUPPOSITORY, RECTAL RECTAL EVERY 12 HOURS PRN
Status: DISCONTINUED | OUTPATIENT
Start: 2022-12-02 | End: 2022-12-02 | Stop reason: HOSPADM

## 2022-12-02 RX ORDER — MODIFIED LANOLIN
OINTMENT (GRAM) TOPICAL
Status: DISCONTINUED | OUTPATIENT
Start: 2022-12-02 | End: 2022-12-04 | Stop reason: HOSPADM

## 2022-12-02 RX ORDER — ONDANSETRON 4 MG/1
4 TABLET, ORALLY DISINTEGRATING ORAL EVERY 6 HOURS PRN
Status: DISCONTINUED | OUTPATIENT
Start: 2022-12-02 | End: 2022-12-02 | Stop reason: HOSPADM

## 2022-12-02 RX ADMIN — PENICILLIN G 3 MILLION UNITS: 3000000 INJECTION, SOLUTION INTRAVENOUS at 12:48

## 2022-12-02 RX ADMIN — SODIUM CHLORIDE, POTASSIUM CHLORIDE, SODIUM LACTATE AND CALCIUM CHLORIDE: 600; 310; 30; 20 INJECTION, SOLUTION INTRAVENOUS at 08:32

## 2022-12-02 RX ADMIN — FENTANYL CITRATE 100 MCG: 50 INJECTION, SOLUTION INTRAMUSCULAR; INTRAVENOUS at 11:47

## 2022-12-02 RX ADMIN — PENICILLIN G 3 MILLION UNITS: 3000000 INJECTION, SOLUTION INTRAVENOUS at 16:31

## 2022-12-02 RX ADMIN — DOCUSATE SODIUM 100 MG: 100 CAPSULE, LIQUID FILLED ORAL at 22:27

## 2022-12-02 RX ADMIN — IBUPROFEN 800 MG: 800 TABLET, FILM COATED ORAL at 22:26

## 2022-12-02 RX ADMIN — SODIUM CHLORIDE, POTASSIUM CHLORIDE, SODIUM LACTATE AND CALCIUM CHLORIDE: 600; 310; 30; 20 INJECTION, SOLUTION INTRAVENOUS at 15:55

## 2022-12-02 RX ADMIN — ACETAMINOPHEN 650 MG: 325 TABLET, FILM COATED ORAL at 23:24

## 2022-12-02 RX ADMIN — Medication: at 13:55

## 2022-12-02 RX ADMIN — PENICILLIN G POTASSIUM 5 MILLION UNITS: 5000000 POWDER, FOR SOLUTION INTRAMUSCULAR; INTRAPLEURAL; INTRATHECAL; INTRAVENOUS at 08:44

## 2022-12-02 RX ADMIN — SODIUM CHLORIDE, POTASSIUM CHLORIDE, SODIUM LACTATE AND CALCIUM CHLORIDE: 600; 310; 30; 20 INJECTION, SOLUTION INTRAVENOUS at 14:04

## 2022-12-02 RX ADMIN — Medication 1 MILLI-UNITS/MIN: at 08:47

## 2022-12-02 RX ADMIN — BUPIVACAINE HYDROCHLORIDE 5 ML: 2.5 INJECTION, SOLUTION EPIDURAL; INFILTRATION; INTRACAUDAL at 13:53

## 2022-12-02 ASSESSMENT — ACTIVITIES OF DAILY LIVING (ADL)
ADLS_ACUITY_SCORE: 18
ADLS_ACUITY_SCORE: 18
TOILETING_ISSUES: NO
DIFFICULTY_EATING/SWALLOWING: NO
ADLS_ACUITY_SCORE: 18
FALL_HISTORY_WITHIN_LAST_SIX_MONTHS: NO
WEAR_GLASSES_OR_BLIND: NO
DOING_ERRANDS_INDEPENDENTLY_DIFFICULTY: NO
ADLS_ACUITY_SCORE: 18
ADLS_ACUITY_SCORE: 18
DRESSING/BATHING_DIFFICULTY: NO
ADLS_ACUITY_SCORE: 18
CONCENTRATING,_REMEMBERING_OR_MAKING_DECISIONS_DIFFICULTY: NO
ADLS_ACUITY_SCORE: 18
ADLS_ACUITY_SCORE: 18
WALKING_OR_CLIMBING_STAIRS_DIFFICULTY: NO
CHANGE_IN_FUNCTIONAL_STATUS_SINCE_ONSET_OF_CURRENT_ILLNESS/INJURY: NO

## 2022-12-02 NOTE — PROGRESS NOTES
[facility]  Labor Progress Note    S: Patient comfortable with epidural.    O:   Patient Vitals for the past 4 hrs:   BP Temp Resp SpO2   22 1455 -- -- -- 100 %   22 1450 -- -- -- 100 %   22 1445 129/68 -- -- 100 %   22 1440 123/69 -- -- 100 %   22 1435 -- -- -- 100 %   22 1425 127/89 -- -- --   22 1420 -- -- -- 100 %   22 1419 131/78 -- -- --   22 1415 -- -- -- (!) 0 %   22 1413 119/70 -- -- --   22 1410 120/68 -- -- (!) 0 %   22 1406 -- -- -- 100 %   22 1403 126/72 -- -- --   22 1401 119/70 -- -- 100 %   22 1357 133/87 -- -- (!) 88 %   22 1355 131/82 -- -- 100 %   22 1353 134/88 -- -- --   22 1352 -- -- -- 94 %   22 1350 (!) 140/87 -- -- 93 %   22 1345 132/86 -- -- --   22 1250 133/89 97.5  F (36.4  C) 18 --   22 1115 130/86 -- -- --     SVE: 6/+1 with bulging bag per RN exam    FHT: Baseline 125, moderate variability, accelerations present, one prolonged and one variable but discontinuous deceleration in the last hour  [place]: 4 contractions in 10 minutes    A/P:  Ms. Cinthya Ang is a 32 year old  at 42w6d by EDC c/w 8w0d US, here for IOL TOLAC. Pregnancy notable for 1 prior CS, class I obesity, short interpregnancy interval, history of LSO and GBS positive.    Labor: - pitocin now off and continuing to contract regularly   - Monitoring: external   - GBS positive, adequate on PCN tx, continue until delivery   - Pain control: epidural working well   - Continuing to make cervical change, now in active labor and theresa spontaneously. Will consider AROM in next hour if patient agreeable and FHT remains Cat I after prolonged decel.     Leah Henke, MD [facility]  22 3:09 PM

## 2022-12-02 NOTE — PROVIDER NOTIFICATION
12/02/22 1427   Provider Notification   Provider Name/Title Dr. Ramos   Method of Notification Electronic Page;At Bedside   Notification Reason Decels  (Prolonged decel, pt was L lateral, repositioned to R side, iv bolus started and pit stopped. pt sve 6/95/1. ht's fluctuated down to 's for approx 4min from 115's.)

## 2022-12-02 NOTE — PROVIDER NOTIFICATION
12/02/22 1728   Provider Notification   Provider Name/Title Dr. Ramos   Method of Notification In Department   Notification Reason SVE;Status Update  (RN touching base with MD on when pt is to be rechecked, will wait until something changes or if ctxs space out per MD)

## 2022-12-02 NOTE — ANESTHESIA PREPROCEDURE EVALUATION
Anesthesia Pre-Procedure Evaluation    Patient: Cinthya Ang   MRN: [id number] : 1990        Procedure :           Past Medical History:   Diagnosis Date     Uncomplicated asthma       Past Surgical History:   Procedure Laterality Date      SECTION N/A 2021    Procedure:  SECTION;  Surgeon: Ashleigh Martinez MD;  Location: [facility] L[facility]     GYN SURGERY Left     cyst & fallopian tube removal      No Known Allergies   Social History     Tobacco Use     Smoking status: Never     Smokeless tobacco: Never   Substance Use Topics     Alcohol use: Not Currently      Wt Readings from Last 1 Encounters:   22 98.9 kg (218 lb)        Anesthesia Evaluation   Pt has had prior anesthetic. Type: OB Labor Epidural.        ROS/MED HX  ENT/Pulmonary:     (+) Intermittent, asthma     Neurologic:  - neg neurologic ROS     Cardiovascular:       METS/Exercise Tolerance:     Hematologic:  - neg hematologic  ROS     Musculoskeletal:       GI/Hepatic:       Renal/Genitourinary:       Endo:     (+) Obesity,     Psychiatric/Substance Use:       Infectious Disease:       Malignancy:       Other:    at 42w6d who presents for an IOL.    Hx Emergent C/S x1 for Cat 2 tracing   (+) , previous ,            OUTSIDE LABS:  CBC:   Lab Results   Component Value Date    WBC 10.8 2022    HGB 13.3 2022    HGB 11.1 (L) 2021    HCT 39.7 2022     2022     BMP:   Lab Results   Component Value Date     (L) 2022    POTASSIUM 3.6 2022    CHLORIDE 102 2022    CO2 20 (L) 2022    BUN 6.0 2022    CR 0.44 (L) 2022     (H) 2022     COAGS: No results found for: PTT, INR, FIBR  POC: No results found for: BGM, HCG, HCGS  HEPATIC:   Lab Results   Component Value Date    ALT 11 2022    AST 19 2022     OTHER:   Lab Results   Component Value Date    GEOVANY 8.9 2022       Anesthesia Plan    ASA Status:  2      Anesthesia Type:  Epidural.              Consents    Anesthesia Plan(s) and associated risks, benefits, and realistic alternatives discussed. Questions answered and patient/representative(s) expressed understanding.    - Discussed:     - Discussed with:  Patient         Postoperative Care            Comments:    Other Comments: Continuous Labor Epidural: Indication is for labor pain. Following an discussion of the procedure, epidural expectations, and risks and benefits (risks including, but not limited to, nerve damage, infection, bleeding/hematoma, inadvertent dural puncture, spinal headache, partial or failed block), the patient appears to understand and consents to proceed. Questions were encouraged and answered.             Carmen Hartman MD

## 2022-12-02 NOTE — PROVIDER NOTIFICATION
12/02/22 1531   Provider Notification   Provider Name/Title Dr. Ramos   Method of Notification Electronic Page;Phone   Request Evaluate in Person   Notification Reason Other (Comment)  (touching base with MD on AROM plan, FHR cat I)

## 2022-12-02 NOTE — ANESTHESIA PROCEDURE NOTES
Epidural catheter Procedure Note    Pre-Procedure   Staff -        Anesthesiologist:  Carmen Hartman MD       Performed By: anesthesiologist       Referred By: Rachel       Location: OB       Pre-Anesthestic Checklist: patient identified, IV checked, risks and benefits discussed, informed consent, monitors and equipment checked, pre-op evaluation and at physician/surgeon's request  Procedure Documentation  Procedure: epidural catheter       Diagnosis: Labor pain       Patient Position: sitting       Patient Prep/Sterile Barriers: sterile gloves, patient draped       Skin prep: Chloraprep       Local skin infiltrated with 2 mL of 1% lidocaine.        Insertion Site: L3-4. (midline approach).       Technique: LORT saline        JACKSON at 5 cm.       Needle Type: Touhy needle       Needle Gauge: 17.        Needle Length (Inches): 3.5        Catheter: 19 G.          Catheter threaded easily.         5 cm epidural space.         Threaded 10 cm at skin.         # of attempts: 1 and  # of redirects:  0    Assessment/Narrative         Paresthesias: No.       Test dose of 3 mL lidocaine 1.5% w/ 1:200,000 epinephrine at 13:50 CST.        .       Insertion/Infusion Method: LORT saline       Aspiration negative for Heme or CSF via Epidural Catheter.    Medication(s) Administered   0.25% Bupivacaine PF (Epidural) - EPIDURAL   5 mL - 12/2/2022 1:53:00 PM   Comments:  Procedure tolerated well without apparent complications. Initial MDA bolus of 0.25% bupivacaine was incrementally given without difficulty or complications. Epidural infusion (0.125% bupi with fentanyl 2mcg/ml) started at 10 ml/hr with PCEA of 5 ml q15min with a max cumulative dose of 25 ml/hr. PCEA instructions given and use encouraged PRN. Epidural expectations again reviewed and questions answered. Patient hemodynamically stable.  Patient and epidural functionality to be reassessed later via vital sign flowsheet, nursing communication, and/or patient report.   "Anesthesiologist immediately available for ongoing assessment and management.           FOR [facility]) ONLY:   Pain Team Contact information: please page the Pain Team [address] [place]. Search \"Pain\". During daytime hours, please page the attending first. At night please page the resident first.    "

## 2022-12-02 NOTE — H&P
[facility] Labor and Delivery   Progress Note    Cinthya Ang MRN# [id number]   Age: 32 year old YOB: 1990     Date of Admission:  22         HPI:   Cinthya Ang is a 32 year old  at 42w6d by EDC c/w 8w0d US (US due date 22) who presents for an IOL. She has had contractions off and on over the course of the week but none persistent. Denies vaginal bleeding or loss of fluid. Notes normal fetal movement. Her sister is here for support.     Pregnancy notable for:  - post-term pregnancy  - 1 prior CS  - Class 1 obesity  - Short interpregnancy interval, last delivery 2021  - History of left salpingo-oophorectomy (laparotomy)  - GBS positive          Pregnancy history:     OBSTETRIC HISTORY:    History of CS x1 (STAT for Ca 2 FHT at 2cm), reviewed labor course and operative note    Prenatal Labs:   O positive, Rubella immune, HIV neg, Heb B/C nonreactive, RPR negative    GBS Status: positive    Medication Prior to Admission  Medications Prior to Admission   Medication Sig Dispense Refill Last Dose     Prenatal Vit-Fe Fumarate-FA (PRENATAL MULTIVITAMIN W/IRON) 27-0.8 MG tablet Take 1 tablet by mouth daily   Past Week     acetaminophen (TYLENOL) 325 MG tablet Take 1-2 tablets (325-650 mg) by mouth every 6 hours as needed for mild pain 60 tablet 1      aspirin (ASA) 81 MG chewable tablet Take 81 mg by mouth daily        bisacodyl (DULCOLAX) 10 MG suppository Place 10 mg rectally        ibuprofen (ADVIL/MOTRIN) 800 MG tablet Take 1 tablet (800 mg) by mouth every 6 hours 60 tablet 1      ondansetron (ZOFRAN ODT) 4 MG ODT tab Take 4 mg by mouth        Prenatal Vit-Fe Fumarate-FA (PRENATAL PLUS) 27-1 MG TABS Take 1 tablet by mouth daily        senna-docusate (SENOKOT-S/PERICOLACE) 8.6-50 MG tablet Take 1 tablet by mouth 2 times daily as needed for constipation 30 tablet 1          Maternal Past Medical History:     Past Medical History:   Diagnosis Date     Uncomplicated  "asthma         Maternal Past Surgical History:     Past Surgical History:   Procedure Laterality Date      SECTION N/A 2021    Procedure:  SECTION;  Surgeon: Ashleigh Martinez MD;  Location: [facility] L+D     GYN SURGERY Left     cyst & fallopian tube removal              Physical Exam:     Vitals:    22 0751   BP: 136/89   Pulse: 113   Resp: 16   Temp: 99.1  F (37.3  C)   TempSrc: Oral   Weight: 98.9 kg (218 lb)   Height: 1.753 m (5' 9\")     Gen: Well appearing, no apparent distress  Cardio: Extremities warm and well perfused  Resp: Breathing comfortably on room air  Abdomen: gravid, soft, nontender. EFW 6-7#  Cervix: 3.5/90/+1, with patient consent membranes were stripped     Fetal Heart Rate Tracing: Baseline 140, moderate variability, accels present, no decels  Tocometer: irregular contraction pattern    Imaging:  BPP 22 8/8, MVP 3.5cm, posterior placenta, cephalic  L2 US normal with f/u scan for anatomy  US for growth 22 EFW 1774g 16%        Assessment:   Cinthya Ang is a 32 year old  at 42w6d by EDC c/w 8w0d US here for IOL in the setting of post-term pregnancy. Pregnancy notable for 1 prior CS, class I obesity, short interpregnancy interval, history of LSO and GBS positive.        Plan:     IOL for TOLAC  - Discussed risk of uterine rupture with TOLAC and additional risk factor (short IPI). She strongly desires TOLAC and verbalizes risks. Signs of uterine rupture discussed and risk of maternal and fetal complications discussed. Also discussed risk of failed TOLAC due to FHT, lack of progress in labor (either 1st or 2nd stage). Will update patient frequently with progress and any concerns about FHT. She is undecided on pain management in labor but is leaning towards not have neuraxial analgesia. We discussed that in the case of a STAT  she may have a higher chance of needing general anesthesia if no neuraxial access but even with an epidural general anesthesia " may be needed.   - will start with pitocin. Patient desires to delay AROM until good contraction pattern. Discussed that AROM may help with contractions and I would recommend it if not in a good contraction pattern on pitocin.   - GBS positive, start PCN with AROM or in active labor   - continuous fetal monitoring  - Hgb, RPR, T&S in process    Prenatal Care  - OB labs reviewed: O, Rubella immune, Heb B Ag non-reactive, HIV negative, RPR negative  - Genetics: declined  - Anatomy ultrasound: normal level 2 US  - Rh positive, Rhogam not indicated  - , HGB 12.7, RPR  - Declined flu, Tdap 22, s/p Covid vaccine   - GBS positive  - Feed: breast, has pump  - Contraception: POPs    H/o PLTCS for fetal indications:  - Desires TOLAC, reviewed risks, benefits, likelihood of success, risk of uterine rupture and TOLAC consent signed 22   - previous CS was for Cat 2 FHR at 2 cm (STAT  for prolonged decels)    Sultana Ramos MD

## 2022-12-02 NOTE — PROVIDER NOTIFICATION
12/02/22 1140   Provider Notification   Provider Name/Title Dr. Ramos   Method of Notification At Bedside  (sve 4/+2, pit @2mu/hr, reviewed strip, contrx q2-3, palpate mod soft between, fht's cat 1, ok for clear liquid diet. pt requesting iv fent.)

## 2022-12-03 PROCEDURE — 120N000001 HC R&B MED SURG/OB

## 2022-12-03 PROCEDURE — 250N000013 HC RX MED GY IP 250 OP 250 PS 637: Performed by: STUDENT IN AN ORGANIZED HEALTH CARE EDUCATION/TRAINING PROGRAM

## 2022-12-03 PROCEDURE — 999N000081 HC STATISTIC IP LACTATION SERVICES 31-45 MIN

## 2022-12-03 PROCEDURE — 250N000013 HC RX MED GY IP 250 OP 250 PS 637: Performed by: OBSTETRICS & GYNECOLOGY

## 2022-12-03 RX ORDER — SENNOSIDES 8.6 MG
8.6 TABLET ORAL 2 TIMES DAILY PRN
Status: DISCONTINUED | OUTPATIENT
Start: 2022-12-03 | End: 2022-12-04 | Stop reason: HOSPADM

## 2022-12-03 RX ADMIN — ACETAMINOPHEN 650 MG: 325 TABLET, FILM COATED ORAL at 14:10

## 2022-12-03 RX ADMIN — IBUPROFEN 800 MG: 800 TABLET, FILM COATED ORAL at 06:32

## 2022-12-03 RX ADMIN — IBUPROFEN 800 MG: 800 TABLET, FILM COATED ORAL at 14:10

## 2022-12-03 RX ADMIN — IBUPROFEN 800 MG: 800 TABLET, FILM COATED ORAL at 21:05

## 2022-12-03 RX ADMIN — ACETAMINOPHEN 650 MG: 325 TABLET, FILM COATED ORAL at 18:17

## 2022-12-03 RX ADMIN — DOCUSATE SODIUM 100 MG: 100 CAPSULE, LIQUID FILLED ORAL at 09:15

## 2022-12-03 RX ADMIN — ACETAMINOPHEN 650 MG: 325 TABLET, FILM COATED ORAL at 06:32

## 2022-12-03 RX ADMIN — Medication: at 06:33

## 2022-12-03 RX ADMIN — SENNOSIDES 8.6 MG: 8.6 TABLET, FILM COATED ORAL at 17:37

## 2022-12-03 RX ADMIN — DOCUSATE SODIUM 100 MG: 100 CAPSULE, LIQUID FILLED ORAL at 21:05

## 2022-12-03 ASSESSMENT — ACTIVITIES OF DAILY LIVING (ADL)
ADLS_ACUITY_SCORE: 22
ADLS_ACUITY_SCORE: 18
ADLS_ACUITY_SCORE: 22
ADLS_ACUITY_SCORE: 18
ADLS_ACUITY_SCORE: 22

## 2022-12-03 NOTE — PLAN OF CARE
Data: Vital signs within normal limits. Postpartum checks within normal limits - see flow record. Patient able to empty bladder independently and is up ambulating. No apparent signs of infection. Patient performing self cares, is able to care for infant and is breast and bottle feeding every 2-3 hours.  Laceration appears within normal. Is using Tylenol and Ibuprofen for mild discomfort.  Action: Patient medicated during the shift for cramping. See MAR.  Patient education done, see flow record.  Response: Positive attachment behaviors observed with infant. Patient's spouse present this shift.   Plan: Continue current plan of care.  Anticipate discharge on 12/4/22.

## 2022-12-03 NOTE — PLAN OF CARE
Patient VSS, good pain control with medications as per MAR. Encouraged to stay hydrated and void frequently. Ambulating and self cares independently. Patient requested senna in addition to colace. 1st dose of senna given during this shift. Breast feeding independently every 2-3 hours. Mother is bonding with the baby.

## 2022-12-03 NOTE — PROVIDER NOTIFICATION
12/02/22 1808   Provider Notification   Provider Name/Title Dr. Ramos   Method of Notification At Bedside   Request Evaluate in Person   Notification Reason SVE;Status Update       Updated MD on SVE 8.5/95/-1, pt requested to be checked as she was feeling rectal pressure. MD ordered to restart pitocin 1x1mu as ctxs have spaced.

## 2022-12-03 NOTE — PLAN OF CARE
Data: Cinthya Ang transferred to 430 via wheelchair at 2300. Baby transferred via parent's arms.  Action: Receiving unit notified of transfer: Yes. Patient and family notified of room change. Report given to VON Randolph at 0845. Belongings sent to receiving unit. Accompanied by Registered Nurse. Oriented patient to surroundings. Call light within reach. ID bands double-checked with receiving RN.  Response: Patient tolerated transfer and is stable.

## 2022-12-03 NOTE — ANESTHESIA POSTPROCEDURE EVALUATION
Patient: Cinthya Ang    Procedure: * No procedures listed *       Anesthesia Type:  Epidural    Note:     Postop Pain Control:    PONV:    Neuro/Psych:    Airway/Respiratory:    CV/Hemodynamics:    Other NRE:    DID A NON-ROUTINE EVENT OCCUR?     Event details/Postop Comments:      S/P epidural for labor.   I or my partner was immediately available for management of this patient during epidural analgesia infusion.  VSS.  Doing well. Block resolved.  Neuro at baseline. Denies positional headache. Minimal side effects easily managed w/ PRN meds. No apparent anesthetic complications. No follow-up required.    Waylon Pedro MD           Last vitals:  Vitals:    12/02/22 2228 12/03/22 0235 12/03/22 0746   BP: 131/80 110/47 117/71   Pulse:  92 88   Resp:  16 16   Temp: 99.8  F (37.7  C) 97.3  F (36.3  C) 98.2  F (36.8  C)   SpO2:          Electronically Signed By: Waylon Pedro MD  December 3, 2022  9:24 AM

## 2022-12-03 NOTE — PROGRESS NOTES
"[facility] Postpartum Note    S:  Cinthya Ang feels well this morning. Was able to sleep last night. Pain control adequate. Lochia minimal. Voiding. Breast and Formula feeding. Mood Good.     O:  Vitals were reviewed  Blood pressure 117/71, pulse 88, temperature 98.2  F (36.8  C), temperature source Oral, resp. rate 16, height 1.753 m (5' 9\"), weight 98.9 kg (218 lb), SpO2 100 %, unknown if currently breastfeeding.      General: healthy, alert and no distress  Abd: soft, appropriately tender, fundus firm  Legs: Non-tender, 0+ pitting edema    RH(D)   Date Value Ref Range Status   2021 Pos  Final     Rubella: immune    Assessment and Plan:   Postpartum Day #1, status post , doing well.  -- Routine care  -- F/U 6 weeks w/ Primary OB  -- Discharge meds: see med rec  -- Contraception: considering    S/p   -doing well    3A perineal laceration  -requesting rx for senna, given  -not requiring narcotics    Zoraida Lee MD    "

## 2022-12-03 NOTE — PLAN OF CARE
VSS, ambulating well in room, independent with cares, assisted with latch and positioning, infant was latching well early at shift, talked about 12 to 24 hr expectations, answered questions, scored #0 on PPD, handed in the birth certificate (on pt's chart, sleeve), brought ROP papers, pt is aware, explained.

## 2022-12-03 NOTE — L&D DELIVERY NOTE
Delivery Summary    Cinthya Ang MRN# [id number]   Age: 32 year old YOB: 1990     Cinthya Ang is a 32 year old  at 42w6d by EDC c/w 8w0d US who was admitted for IOL for TOLAC on 2022. Pregnancy was notable for 1 prior CS, class I obesity, short interpregnancy interval, history of LSO and GBS positive status. She had previously refused IOL and then presented in early labor but the provider at the time declined augmenting with pitocin and she refused AROM and left AMA. She had been previously counseled extensively on TOLAC and we discussed risks/benefits again on arrival. She was admitted on the AM of  and pitocin was started but shut off after a prolonged deceleration with tachysystole. She received an epidural with good pain relief. AROM was performed at 1540 with meconium stained fluid. Pitocin was restarted at 1816. She received >4 hrs of penicillin prior to delivery. She was complete and +3 at 194 at which time recurrent decelerations were present and the patient was asked to push. She pushed with excellent effort and at  on 22 a vigorous female infant was delivered in the JESSE position with apgars of 8 and 9. Weight 3400g. Delayed cord clamping was done for >60 seconds. The placenta delivered spontaneously, intact with a 3 vessel cord at . Pitocin was given after delivery of the baby. A 3A laceration was present with extension through the superficial portion of the external anal sphincter. 2 reinforcing stitches were placed through the sphincter to reapproximate it using 2-0 Vicryl. The remainder of the laceration was repaired in the standard fashion with 3-0 Monocryl. QBL of 215 mL. Sponge and sharp counts were correct.     Sultana Ramos MD         Ashia, Female-Cinthya [[id number]]    Labor Event Times    Dilation complete date: 22 Complete time:  7:47 PM   Start pushing date/time: 2022      Labor Length    2nd Stage (hrs): 0 (min): 13   3rd Stage (hrs):  0 (min): 5      Labor Events     labor?: No   steroids: None  Labor Type: Induction/Cervical ripening, Augmentation  Predominate monitoring during 1st stage: continuous electronic fetal monitoring     Antibiotics received during labor?: Yes  Reason for Antibiotics: GBS  Antibiotics received for GBS: Penicillin  Antibiotics Given (GBS): Greater than 4 hours prior to delivery     Rupture date/time: 22 1540   Rupture type: Artificial Rupture of Membranes  Fluid color: Meconium  Fluid odor: Normal     Induction: Oxytocin  Induction date/time:     Cervical ripening date/time:     Indications for induction: Post-term Gestation     Augmentation: AROM  Indications for augmentation: Ineffective Contraction Pattern  1:1 continuous labor support provided by?: RN       Delivery/Placenta Date and Time    Delivery Date: 22 Delivery Time:  8:00 PM   Placenta Date/Time: 2022  8:05 PM  Oxytocin given at the time of delivery: after delivery of baby  Delivering clinician: Sultana Ramos MD          Vaginal Counts     Initial count performed by 2 team members:  Two Team Members   Dr. Rachel ANDERSON RN       Needles Suture Needles Sponges (RETIRED) Instruments   Initial counts 2  5    Added to count  2     Relief counts       Final counts 2 2 5          Placed during labor Accounted for at the end of labor   FSE No NA   IUPC No NA   Cervidil No NA              Final count performed by 2 team members:  Two Team Members   Gerson Ramos       Final count correct?: Yes     Apgars    Living status: Living   1 Minute 5 Minute 10 Minute 15 Minute 20 Minute   Skin color: 1  1       Heart rate: 2  2       Reflex irritability: 2  2       Muscle tone: 2  2       Respiratory effort: 1  2       Total: 8  9       Apgars assigned by: GERSON HAM RN     Cord    Vessels: 3 Vessels    Cord Complications: None               Cord Blood Disposition: Lab    Gases Sent?: No    Delayed cord  clamping?: Yes    Cord Clamping Delay (seconds):  seconds    Stem cell collection?: No        Resuscitation    Methods: None     [place] Measurements    Weight: 7 lb 7.9 oz       Skin to Skin and Feeding Plan    Skin to skin initiation date/time:     Skin to skin with: Mother  Skin to skin end date/time:        Labor Events and Shoulder Dystocia    Fetal Tracing Prior to Delivery: Category 2  Shoulder dystocia present?: Neg     Delivery (Maternal) (Provider to Complete) (160795)    Episiotomy: None  Perineal lacerations: 3rd Repaired?: Yes   Repair suture: 3-0 Monocryl, 2-0 Vicryl  Number of repair packets: 2  Genital tract inspection done: Pos     Blood Loss  Mother: Cinthya Ang #[id number]   Start of Mother's Information    Delivery Blood Loss  22 0800 - 22 2208    Delivery QBL (mL) Hospital Encounter 215 mL    Total  215 mL         End of Mother's Information  Mother: Cinthya Ang #[id number]          Delivery - Provider to Complete ([id number])    Delivering clinician: Sultana Ramos MD  Attempted Delivery Types (Choose all that apply): Vaginal Birth After   Delivery Type (Choose the 1 that will go to the Birth History): , Spontaneous                                 Placenta    Date/Time: 2022  8:05 PM  Removal: Expressed  Disposition: Hospital disposal           Anesthesia    Method: Epidural  Cervical dilation at placement: 4-7                Presentation and Position    Presentation: Vertex    Position: Left Occiput Anterior                 Sultana Ramos MD

## 2022-12-03 NOTE — DISCHARGE SUMMARY
[facility] Discharge Summary    Cinthya Ang MRN# [id number]   Age: 32 year old YOB: 1990     Date of Admission:  2022  Date of Discharge:  2022  Admitting Physician:  Sultana Ramos MD  Discharge Physician:  Sultana Ramos MD    Admit Dx:   -  at 42w6d   - Late-term pregnancy  - 1 prior CS  - Short interpregnancy interval  - History of LSO  - GBS positive    Discharge Dx:  - , s/p   - 3A laceration  - Same as above    Procedures:  - Vaginal birth after  delivery  - Epidural analgesia    Admit HPI/Labor Course:  Citnhya Ang is a 32 year old  at 42w6d by EDC c/w 8w0d US who was admitted for IOL for TOLAC on 2022. Pregnancy was notable for 1 prior CS, class I obesity, short interpregnancy interval, history of LSO and GBS positive status. She had previously refused IOL and then presented in early labor but the provider at the time declined augmenting with pitocin and she refused AROM and left AMA. She had been previously counseled extensively on TOLAC and we discussed risks/benefits again on arrival. She was admitted on the AM of  and pitocin was started but shut off after a prolonged deceleration with tachysystole. She received an epidural with good pain relief. AROM was performed at 1540 with meconium stained fluid. Pitocin was restarted at 1816. She received >4 hrs of penicillin prior to delivery. She was complete and +3 at 1947 at which time recurrent decelerations were present and the patient was asked to push. She pushed with excellent effort and at  on 22 a vigorous female infant was delivered in the JESSE position with apgars of 8 and 9. Weight 3400g. Delayed cord clamping was done for >60 seconds. The placenta delivered spontaneously, intact with a 3 vessel cord at . Pitocin was given after delivery of the baby. A 3A laceration was present with extension through the superficial portion of the external anal sphincter.  2 reinforcing stitches were placed through the sphincter to reapproximate it using 2-0 Vicryl. The remainder of the laceration was repaired in the standard fashion with 3-0 Monocryl. QBL of 215 mL. Sponge and sharp counts were correct.     Please see her Admission H&P and Delivery Summary for further details.    Postpartum Course:  Her postpartum course was uncomplicated. On PPD#2, she was meeting all of her postpartum goals and deemed stable for discharge. She was voiding without difficulty, tolerating a regular diet without nausea and vomiting, her pain was well controlled on oral pain medicines and her lochia was appropriate. Her hemoglobin prior to delivery was 13.3. Her Rh status was positive, and Rhogam was not indicated.     Discharge Medications:     Review of your medicines      START taking      Dose / Directions   polyethylene glycol 17 GM/Dose powder  Commonly known as: MIRALAX  Used for: Third degree laceration of perineum during delivery, postpartum      Dose: 1 capful  Take 17 g (1 capful) by mouth daily  Quantity: 289 g  Refills: 0        CONTINUE these medicines which may have CHANGED, or have new prescriptions. If we are uncertain of the size of tablets/capsules you have at home, strength may be listed as something that might have changed.      Dose / Directions   * acetaminophen 325 MG tablet  Commonly known as: TYLENOL  This may have changed: Another medication with the same name was added. Make sure you understand how and when to take each.  Used for: S/P primary low transverse       Dose: 325-650 mg  Take 1-2 tablets (325-650 mg) by mouth every 6 hours as needed for mild pain  Quantity: 60 tablet  Refills: 1     * acetaminophen 325 MG tablet  Commonly known as: TYLENOL  This may have changed: You were already taking a medication with the same name, and this prescription was added. Make sure you understand how and when to take each.  Used for:  (vaginal birth after )       Dose: 325-650 mg  Take 1-2 tablets (325-650 mg) by mouth every 4 hours as needed for mild pain  Refills: 0     * ibuprofen 800 MG tablet  Commonly known as: ADVIL/MOTRIN  This may have changed: Another medication with the same name was added. Make sure you understand how and when to take each.  Used for: S/P primary low transverse       Dose: 800 mg  Take 1 tablet (800 mg) by mouth every 6 hours  Quantity: 60 tablet  Refills: 1     * ibuprofen 600 MG tablet  Commonly known as: ADVIL/MOTRIN  This may have changed: You were already taking a medication with the same name, and this prescription was added. Make sure you understand how and when to take each.  Used for:  (vaginal birth after )      Dose: 600 mg  Take 1 tablet (600 mg) by mouth every 6 hours as needed for other (cramping)  Quantity: 40 tablet  Refills: 0     prenatal multivitamin w/iron 27-0.8 MG tablet  This may have changed: Another medication with the same name was removed. Continue taking this medication, and follow the directions you see here.      Dose: 1 tablet  Take 1 tablet by mouth daily  Refills: 0         * This list has 4 medication(s) that are the same as other medications prescribed for you. Read the directions carefully, and ask your doctor or other care provider to review them with you.            CONTINUE these medicines which have NOT CHANGED      Dose / Directions   senna-docusate 8.6-50 MG tablet  Commonly known as: SENOKOT-S/PERICOLACE  Used for: S/P primary low transverse       Dose: 1 tablet  Take 1 tablet by mouth 2 times daily as needed for constipation  Quantity: 30 tablet  Refills: 0        STOP taking    aspirin 81 MG chewable tablet  Commonly known as: ASA        bisacodyl 10 MG suppository  Commonly known as: DULCOLAX        ondansetron 4 MG ODT tab  Commonly known as: ZOFRAN ODT              Where to get your medicines      These medications were sent to [facility] -  [place], [place] - [address] [address]  [address] [address] [address]    Phone: [phone]     ibuprofen 600 MG tablet    polyethylene glycol 17 GM/Dose powder    senna-docusate 8.6-50 MG tablet     Some of these will need a paper prescription and others can be bought over the counter. Ask your nurse if you have questions.    You don't need a prescription for these medications    acetaminophen 325 MG tablet       Discharge/Disposition:  Cinthya Ang was discharged to home in stable condition with the following instructions/medications:  1) Call for temperature > 100.4, bright red vaginal bleeding >1 pad an hour x 2 hours, foul smelling vaginal discharge, pain not controlled by usual oral pain meds, persistent nausea and vomiting not controlled on medications  2) She desired POPs for contraception.  3) For feeding she decided to breastfeed.  4) She was instructed to follow-up with her primary OB in 6 weeks for a routine postpartum visit or sooner if any concerns.    Leah C. Henke, MD [facility]

## 2022-12-04 VITALS
BODY MASS INDEX: 32.29 KG/M2 | TEMPERATURE: 97.7 F | RESPIRATION RATE: 18 BRPM | DIASTOLIC BLOOD PRESSURE: 71 MMHG | SYSTOLIC BLOOD PRESSURE: 117 MMHG | WEIGHT: 218 LBS | HEART RATE: 78 BPM | HEIGHT: 69 IN | OXYGEN SATURATION: 100 %

## 2022-12-04 PROCEDURE — 250N000013 HC RX MED GY IP 250 OP 250 PS 637: Performed by: STUDENT IN AN ORGANIZED HEALTH CARE EDUCATION/TRAINING PROGRAM

## 2022-12-04 PROCEDURE — 999N000079 HC STATISTIC IP LACTATION SERVICES 1-15 MIN

## 2022-12-04 PROCEDURE — 250N000013 HC RX MED GY IP 250 OP 250 PS 637: Performed by: OBSTETRICS & GYNECOLOGY

## 2022-12-04 RX ORDER — IBUPROFEN 600 MG/1
600 TABLET, FILM COATED ORAL EVERY 6 HOURS PRN
Qty: 40 TABLET | Refills: 0 | Status: SHIPPED | OUTPATIENT
Start: 2022-12-04

## 2022-12-04 RX ORDER — AMOXICILLIN 250 MG
1 CAPSULE ORAL 2 TIMES DAILY PRN
Qty: 30 TABLET | Refills: 0 | Status: SHIPPED | OUTPATIENT
Start: 2022-12-04

## 2022-12-04 RX ORDER — POLYETHYLENE GLYCOL 3350 17 G/17G
1 POWDER, FOR SOLUTION ORAL DAILY
Qty: 289 G | Refills: 0 | Status: SHIPPED | OUTPATIENT
Start: 2022-12-04

## 2022-12-04 RX ORDER — ACETAMINOPHEN 325 MG/1
325-650 TABLET ORAL EVERY 4 HOURS PRN
COMMUNITY
Start: 2022-12-04

## 2022-12-04 RX ADMIN — IBUPROFEN 800 MG: 800 TABLET, FILM COATED ORAL at 03:21

## 2022-12-04 RX ADMIN — SENNOSIDES 8.6 MG: 8.6 TABLET, FILM COATED ORAL at 08:09

## 2022-12-04 ASSESSMENT — ACTIVITIES OF DAILY LIVING (ADL)
ADLS_ACUITY_SCORE: 18

## 2022-12-04 NOTE — PROGRESS NOTES
[facility]   Post-Partum Note    Name:  Cinthya Ang  MRN: [id number]    S: Patient doing well this AM.  Pain well controlled.  Tolerating regular diet without nausea or vomiting.  Ambulating without dizziness.  Lochia less than a typical period. No BM yet, voiding ok. Breast feeding.  Plans discharge today.    O:   Patient Vitals for the past 24 hrs:   BP Temp Temp src Pulse Resp   22 0813 117/71 97.7  F (36.5  C) Oral 78 18   22 0321 119/67 97.8  F (36.6  C) Oral 89 16   22 130/80 97.9  F (36.6  C) Oral 97 16   22 1555 118/60 97.9  F (36.6  C) Oral 99 16     Gen:  Resting comfortably, NAD  CV:  Regular rate  Pulm:  Breathing comfortably on room air   Abd:  Soft, non-distended.Fundus below umbilicus, firm and non-tender.  Ext:  non-tender, trace LE edema b/l    Hgb:   Recent Labs   Lab Test 22  0538   HGB 13.3       Assessment/Plan:  32 year old  on PPD #2 s/p . Pregnancy notable for GBS positive status with adequate treatment, history of LSO and 3A laceration.    - continue with routine postpartum management  Pain: Well-controlled with ibuprofen and tylenol  Hgb: 13.3>  Rh: positive  Rubella: immune  Feed: breast  BC: POPs if breastfeeding, CHC patch if not  Dispo: Plan for home today    Leah Henke, MD [facility]  22

## 2022-12-04 NOTE — LACTATION NOTE
Lactation in to follow up. Baby at breast at time of visit. Baby eager at breast. Patient is doing both breast and bottle, but feels like baby does not like formula as much. Has a spectra pump at home. Encouraged patient to do some pumping to help increase milk supply and to use for supplementing if needed. No questions before home.

## 2022-12-04 NOTE — PLAN OF CARE
Data: Vital signs within normal limits. Postpartum checks within normal limits, see flow record. Patient eating and drinking normally. Patient able to empty bladder independently and is up ambulating. No apparent signs of infection.  3rd degree laceration healing well. Patient performing self cares and is able to care for infant.  Action: Patient medicated during the shift for cramping. See MAR. Patient reassessed within 1 hour after each medication and pain was improved, patient stated she was comfortable. Patient education done about safe infant sleep, basic infant cares, bulb syringe usage, breastfeeding, positions and satiety, PPD and resources available. See flow record.PP and  booklet reviewed and questions answered.   Response: Positive attachment behaviors observed with infant. Support person not present overnight.   Plan: Anticipate discharge on 22.

## 2022-12-04 NOTE — PLAN OF CARE
VSS, breast feeding independently well, discharge completed, follow up explained in 6 weeks, questions answered, discharged in stable condition with infant.

## 2024-07-18 LAB
HEPATITIS B SURFACE ANTIGEN (EXTERNAL): NEGATIVE
HIV1+2 AB SERPL QL IA: NEGATIVE
RUBELLA ANTIBODY IGG (EXTERNAL): NORMAL

## 2024-09-14 ENCOUNTER — HOSPITAL ENCOUNTER (EMERGENCY)
Facility: CLINIC | Age: 34
End: 2024-09-14
Payer: COMMERCIAL

## 2024-09-14 ENCOUNTER — HOSPITAL ENCOUNTER (OUTPATIENT)
Facility: CLINIC | Age: 34
Discharge: HOME | End: 2024-09-14
Attending: OBSTETRICS & GYNECOLOGY | Admitting: OBSTETRICS & GYNECOLOGY
Payer: COMMERCIAL

## 2024-09-14 ENCOUNTER — HOSPITAL ENCOUNTER (OUTPATIENT)
Facility: CLINIC | Age: 34
End: 2024-09-14
Admitting: OBSTETRICS & GYNECOLOGY
Payer: COMMERCIAL

## 2024-09-14 VITALS — DIASTOLIC BLOOD PRESSURE: 69 MMHG | SYSTOLIC BLOOD PRESSURE: 119 MMHG | RESPIRATION RATE: 18 BRPM | TEMPERATURE: 98.8 F

## 2024-09-14 DIAGNOSIS — O21.9 NAUSEA AND VOMITING DURING PREGNANCY: Primary | ICD-10-CM

## 2024-09-14 PROBLEM — Z36.89 ENCOUNTER FOR TRIAGE IN PREGNANT PATIENT: Status: ACTIVE | Noted: 2024-09-14

## 2024-09-14 PROCEDURE — 250N000013 HC RX MED GY IP 250 OP 250 PS 637: Performed by: OBSTETRICS & GYNECOLOGY

## 2024-09-14 PROCEDURE — G0463 HOSPITAL OUTPT CLINIC VISIT: HCPCS

## 2024-09-14 RX ORDER — ONDANSETRON 4 MG/1
1 TABLET, FILM COATED ORAL EVERY 8 HOURS PRN
Status: ON HOLD | COMMUNITY
Start: 2024-07-18 | End: 2024-09-14

## 2024-09-14 RX ORDER — LIDOCAINE 40 MG/G
CREAM TOPICAL
Status: DISCONTINUED | OUTPATIENT
Start: 2024-09-14 | End: 2024-09-14 | Stop reason: HOSPADM

## 2024-09-14 RX ORDER — ONDANSETRON 4 MG/1
4 TABLET, FILM COATED ORAL EVERY 8 HOURS PRN
Qty: 30 TABLET | Refills: 2 | Status: SHIPPED | OUTPATIENT
Start: 2024-09-14

## 2024-09-14 RX ORDER — MAGNESIUM HYDROXIDE/ALUMINUM HYDROXICE/SIMETHICONE 120; 1200; 1200 MG/30ML; MG/30ML; MG/30ML
15 SUSPENSION ORAL ONCE
Status: COMPLETED | OUTPATIENT
Start: 2024-09-14 | End: 2024-09-14

## 2024-09-14 RX ADMIN — ALUMINUM HYDROXIDE, MAGNESIUM HYDROXIDE, AND SIMETHICONE 15 ML: 200; 200; 20 SUSPENSION ORAL at 18:06

## 2024-09-14 ASSESSMENT — ACTIVITIES OF DAILY LIVING (ADL): ADLS_ACUITY_SCORE: 35

## 2024-09-14 NOTE — PROVIDER NOTIFICATION
24 2183   Provider Notification   Provider Name/Title Dr. Wolf   Method of Notification In Department   Request Evaluate - Remote   Notification Reason Patient Arrived     MD updated. Patient here with heartburn, nausea, and vomiting. Patient reports she has been trying to decrease the frequency she is taking Zofran at home to manage her nausea. She stopped taking Zofran yesterday and reports from 3-7am today she had 4 emesis and 3 more through the day. She last took Zofran this afternoon at 1630, currently denies nausea. Also reporting lower abdominal pain wrapping around to her sides including where her previous  incision is. Denies constipation, having regular stools and softer stools today following stool softener. FHR auscultated with doppler, 145 BPM. Patient requesting refill of Zofran be sent to her preferred pharmacy.    RN may offer following choices: IV hydration, IV or oral antiemetics, MAALOX (GI cocktail).    RN discussed options with patient, she does not desire IV fluids or antiemetics, she would like to try the MAALOX for her heartburn discomfort.

## 2024-09-14 NOTE — PLAN OF CARE
Data: Patient presented to [facility]: 2024  5:16 PM.  Reason for maternal/fetal assessment is nausea and vomiting. Patient reports many occurences of nausea and vomiting sine 0300 today as well as constant lower abdominal soreness rated 6/10. Patient is a .  Prenatal record reviewed. Pregnancy  has been complicated by has been uncomplicated.  Gestational Age 19w4d. VSS. Patient is not yet feeling fetal movement. Patient denies uterine contractions, leaking of vaginal fluid/rupture of membranes, vaginal bleeding, abdominal pain, pelvic pressure, headache, visual disturbances, epigastric or RUQ pain, significant edema. Support person is not present.   Action: Verbal consent for EFM. Triage assessment completed. Bill of rights reviewed.  Response: Patient verbalized agreement with plan. Will contact Dr Monika Wolf with update and further orders.

## 2024-09-14 NOTE — DISCHARGE INSTRUCTIONS
Learning About When to Call Your Doctor During Pregnancy (Up to 20 Weeks)  Overview     It's common to have concerns about what might be a problem during your pregnancy. Most pregnancies don't have any serious problems. But it's still important to know when to call your doctor if you have certain symptoms.  These are general suggestions. Your doctor may give you some more information about when to call.  When to call your doctor (up to 20 weeks)  Call 911 anytime you think you may need emergency care. For example, call if:  You have severe vaginal bleeding. This means you are soaking through a pad each hour for 2 or more hours.  You have chest pain, are short of breath, or cough up blood.  You have sudden, severe pain in your belly.  You passed out (lost consciousness).  Call your doctor now or seek immediate medical care if:  You have a fever.  You have vaginal bleeding.  You are dizzy or lightheaded, or you feel like you may faint.  You have signs of a blood clot in your leg (called a deep vein thrombosis), such as:  Pain in the calf, back of the knee, thigh, or groin.  Swelling in your leg or groin.  A color change on the leg or groin. The skin may be reddish or purplish, depending on your usual skin color.  You have symptoms of a urinary tract infection. These may include:  Pain or burning when you urinate.  A frequent need to urinate without being able to pass much urine.  Pain in the flank, which is just below the rib cage and above the waist on either side of the back.  Blood in your urine.  You have belly pain.  You think you are having contractions.  Watch closely for changes in your health, and be sure to contact your doctor if:  You have vaginal discharge that smells bad.  You feel sad, anxious, or hopeless for more than a few days.  You have other concerns about your pregnancy.  Follow-up care is a key part of your treatment and safety. Be sure to make and go to all appointments, and call your doctor if  "you are having problems. It's also a good idea to know your test results and keep a list of the medicines you take.  Where can you learn more?  Go to https://www.[employer].net/patiented  Enter G674 in the search box to learn more about \"Learning About When to Call Your Doctor During Pregnancy (Up to 20 Weeks).\"  Current as of: July 10, 2023  Content Version: 14.1 2006-2024 [employer].   Care instructions adapted under license by your healthcare professional. If you have questions about a medical condition or this instruction, always ask your healthcare professional. [employer] disclaims any warranty or liability for your use of this information.      For heartburn relief you may take Famotidine (Pepcid) 20 mg twice a day as needed.     "

## 2024-09-14 NOTE — PROVIDER NOTIFICATION
09/14/24 1810   Provider Notification   Provider Name/Title Dr. Wolf   Method of Notification In Department   Request Evaluate - Remote   Notification Reason Status Update     MD updated. MAALOX given. Patient states she is feeling ready for discharge and will feel more comfortable at home. Tolerating sips of water. If patient desires medication for heartburn at home she may take Pepcid 20 mg BID. Verbal orders to discharge patient to home.

## 2024-09-14 NOTE — PLAN OF CARE
Data: Patient assessed in the [facility] for nausea and vomiting.  Cervical exam not examined.  Membranes intact.  Contractions none reported. Patient reports her heartburn has improved following MAALOX. Patient feeling comfortable with plan for discharge.  Action:  Presumed adequate fetal oxygenation documented (see flow record). Discharge instructions reviewed.  Patient instructed to report change in fetal movement, vaginal leaking of fluid or bleeding, abdominal pain, or any concerns related to the pregnancy to her nurse/physician.    Response: Orders to discharge home per Monika Wolf.  Patient verbalized understanding of education and verbalized agreement with plan. Discharged to home at 1825.

## 2024-12-18 NOTE — PROVIDER NOTIFICATION
05/20/21 0715   Provider Notification   Provider Name/Title Dr. Martinez   Method of Notification In Department     Patient uncomfortable and requesting an epidural. Updated Dr. Martinez on plan to start penicillin now, get the patient an epidural and get her comfortable and then to start pitocin. MD's plan is to AROM after 4 hours of penicillin is in, if appropriate. Report given to VON Snow who resumes cares.   UA and Labs ordered. If symptoms worsen given I will be out of office she may consider evaluation either in clinic or ER.

## 2025-01-15 LAB — GROUP B STREPTOCOCCUS (EXTERNAL): NEGATIVE

## 2025-02-06 ENCOUNTER — HOSPITAL ENCOUNTER (INPATIENT)
Facility: CLINIC | Age: 35
End: 2025-02-06
Attending: OBSTETRICS & GYNECOLOGY | Admitting: STUDENT IN AN ORGANIZED HEALTH CARE EDUCATION/TRAINING PROGRAM
Payer: COMMERCIAL

## 2025-02-06 DIAGNOSIS — Z37.9 NORMAL LABOR: Primary | ICD-10-CM

## 2025-02-06 PROBLEM — Z98.891 HISTORY OF CESAREAN DELIVERY: Status: ACTIVE | Noted: 2025-02-06

## 2025-02-06 PROBLEM — Z98.891 HISTORY OF VAGINAL BIRTH AFTER CESAREAN: Status: ACTIVE | Noted: 2025-02-06

## 2025-02-06 LAB
ABO + RH BLD: NORMAL
BLD GP AB SCN SERPL QL: NEGATIVE
ERYTHROCYTE [DISTWIDTH] IN BLOOD BY AUTOMATED COUNT: 12.2 % (ref 10–15)
HCT VFR BLD AUTO: 36.7 % (ref 35–47)
HGB BLD-MCNC: 12.7 G/DL (ref 11.7–15.7)
MCH RBC QN AUTO: 28.4 PG (ref 26.5–33)
MCHC RBC AUTO-ENTMCNC: 34.6 G/DL (ref 31.5–36.5)
MCV RBC AUTO: 82 FL (ref 78–100)
PLATELET # BLD AUTO: 221 10E3/UL (ref 150–450)
RBC # BLD AUTO: 4.47 10E6/UL (ref 3.8–5.2)
SPECIMEN EXP DATE BLD: NORMAL
WBC # BLD AUTO: 10 10E3/UL (ref 4–11)

## 2025-02-06 PROCEDURE — 250N000009 HC RX 250: Performed by: STUDENT IN AN ORGANIZED HEALTH CARE EDUCATION/TRAINING PROGRAM

## 2025-02-06 PROCEDURE — 85041 AUTOMATED RBC COUNT: CPT | Performed by: STUDENT IN AN ORGANIZED HEALTH CARE EDUCATION/TRAINING PROGRAM

## 2025-02-06 PROCEDURE — 120N000001 HC R&B MED SURG/OB

## 2025-02-06 PROCEDURE — 86900 BLOOD TYPING SEROLOGIC ABO: CPT | Performed by: STUDENT IN AN ORGANIZED HEALTH CARE EDUCATION/TRAINING PROGRAM

## 2025-02-06 PROCEDURE — 999N000016 HC STATISTIC ATTENDANCE AT DELIVERY

## 2025-02-06 PROCEDURE — 250N000011 HC RX IP 250 OP 636: Mod: JW | Performed by: STUDENT IN AN ORGANIZED HEALTH CARE EDUCATION/TRAINING PROGRAM

## 2025-02-06 PROCEDURE — 722N000001 HC LABOR CARE VAGINAL DELIVERY SINGLE

## 2025-02-06 PROCEDURE — 85014 HEMATOCRIT: CPT | Performed by: STUDENT IN AN ORGANIZED HEALTH CARE EDUCATION/TRAINING PROGRAM

## 2025-02-06 PROCEDURE — 258N000003 HC RX IP 258 OP 636: Performed by: STUDENT IN AN ORGANIZED HEALTH CARE EDUCATION/TRAINING PROGRAM

## 2025-02-06 RX ORDER — OXYTOCIN/0.9 % SODIUM CHLORIDE 30/500 ML
340 PLASTIC BAG, INJECTION (ML) INTRAVENOUS CONTINUOUS PRN
Status: DISCONTINUED | OUTPATIENT
Start: 2025-02-06 | End: 2025-02-06 | Stop reason: HOSPADM

## 2025-02-06 RX ORDER — KETOROLAC TROMETHAMINE 30 MG/ML
30 INJECTION, SOLUTION INTRAMUSCULAR; INTRAVENOUS
Status: COMPLETED | OUTPATIENT
Start: 2025-02-06 | End: 2025-02-06

## 2025-02-06 RX ORDER — MODIFIED LANOLIN
OINTMENT (GRAM) TOPICAL
Status: ACTIVE | OUTPATIENT
Start: 2025-02-06

## 2025-02-06 RX ORDER — METOCLOPRAMIDE HYDROCHLORIDE 5 MG/ML
10 INJECTION INTRAMUSCULAR; INTRAVENOUS EVERY 6 HOURS PRN
Status: DISCONTINUED | OUTPATIENT
Start: 2025-02-06 | End: 2025-02-06 | Stop reason: HOSPADM

## 2025-02-06 RX ORDER — CARBOPROST TROMETHAMINE 250 UG/ML
250 INJECTION, SOLUTION INTRAMUSCULAR
Status: DISCONTINUED | OUTPATIENT
Start: 2025-02-06 | End: 2025-02-06 | Stop reason: HOSPADM

## 2025-02-06 RX ORDER — LOPERAMIDE HYDROCHLORIDE 2 MG/1
2 CAPSULE ORAL
Status: ACTIVE | OUTPATIENT
Start: 2025-02-06

## 2025-02-06 RX ORDER — OXYTOCIN 10 [USP'U]/ML
10 INJECTION, SOLUTION INTRAMUSCULAR; INTRAVENOUS
Status: DISCONTINUED | OUTPATIENT
Start: 2025-02-06 | End: 2025-02-06 | Stop reason: HOSPADM

## 2025-02-06 RX ORDER — LOPERAMIDE HYDROCHLORIDE 2 MG/1
4 CAPSULE ORAL
Status: ACTIVE | OUTPATIENT
Start: 2025-02-06

## 2025-02-06 RX ORDER — DOCUSATE SODIUM 100 MG/1
100 CAPSULE, LIQUID FILLED ORAL DAILY
Status: ACTIVE | OUTPATIENT
Start: 2025-02-07

## 2025-02-06 RX ORDER — OXYTOCIN 10 [USP'U]/ML
10 INJECTION, SOLUTION INTRAMUSCULAR; INTRAVENOUS
Status: ACTIVE | OUTPATIENT
Start: 2025-02-06

## 2025-02-06 RX ORDER — CARBOPROST TROMETHAMINE 250 UG/ML
250 INJECTION, SOLUTION INTRAMUSCULAR
Status: ACTIVE | OUTPATIENT
Start: 2025-02-06

## 2025-02-06 RX ORDER — METHYLERGONOVINE MALEATE 0.2 MG/ML
200 INJECTION INTRAVENOUS
Status: ACTIVE | OUTPATIENT
Start: 2025-02-06

## 2025-02-06 RX ORDER — FENTANYL CITRATE-0.9 % NACL/PF 10 MCG/ML
100 PLASTIC BAG, INJECTION (ML) INTRAVENOUS EVERY 5 MIN PRN
Status: DISCONTINUED | OUTPATIENT
Start: 2025-02-06 | End: 2025-02-06 | Stop reason: HOSPADM

## 2025-02-06 RX ORDER — SODIUM CHLORIDE, SODIUM LACTATE, POTASSIUM CHLORIDE, CALCIUM CHLORIDE 600; 310; 30; 20 MG/100ML; MG/100ML; MG/100ML; MG/100ML
INJECTION, SOLUTION INTRAVENOUS CONTINUOUS
Status: DISCONTINUED | OUTPATIENT
Start: 2025-02-06 | End: 2025-02-06 | Stop reason: HOSPADM

## 2025-02-06 RX ORDER — TRANEXAMIC ACID 10 MG/ML
1 INJECTION, SOLUTION INTRAVENOUS EVERY 30 MIN PRN
Status: DISCONTINUED | OUTPATIENT
Start: 2025-02-06 | End: 2025-02-06 | Stop reason: HOSPADM

## 2025-02-06 RX ORDER — NALOXONE HYDROCHLORIDE 0.4 MG/ML
0.2 INJECTION, SOLUTION INTRAMUSCULAR; INTRAVENOUS; SUBCUTANEOUS
Status: DISCONTINUED | OUTPATIENT
Start: 2025-02-06 | End: 2025-02-06 | Stop reason: HOSPADM

## 2025-02-06 RX ORDER — NALOXONE HYDROCHLORIDE 0.4 MG/ML
0.4 INJECTION, SOLUTION INTRAMUSCULAR; INTRAVENOUS; SUBCUTANEOUS
Status: DISCONTINUED | OUTPATIENT
Start: 2025-02-06 | End: 2025-02-06 | Stop reason: HOSPADM

## 2025-02-06 RX ORDER — LOPERAMIDE HYDROCHLORIDE 2 MG/1
2 CAPSULE ORAL
Status: DISCONTINUED | OUTPATIENT
Start: 2025-02-06 | End: 2025-02-06 | Stop reason: HOSPADM

## 2025-02-06 RX ORDER — PROCHLORPERAZINE MALEATE 10 MG
10 TABLET ORAL EVERY 6 HOURS PRN
Status: DISCONTINUED | OUTPATIENT
Start: 2025-02-06 | End: 2025-02-06 | Stop reason: HOSPADM

## 2025-02-06 RX ORDER — NALBUPHINE HYDROCHLORIDE 10 MG/ML
2.5-5 INJECTION INTRAMUSCULAR; INTRAVENOUS; SUBCUTANEOUS EVERY 6 HOURS PRN
Status: ACTIVE | OUTPATIENT
Start: 2025-02-06

## 2025-02-06 RX ORDER — METOCLOPRAMIDE 10 MG/1
10 TABLET ORAL EVERY 6 HOURS PRN
Status: DISCONTINUED | OUTPATIENT
Start: 2025-02-06 | End: 2025-02-06 | Stop reason: HOSPADM

## 2025-02-06 RX ORDER — BISACODYL 10 MG
10 SUPPOSITORY, RECTAL RECTAL DAILY PRN
Status: ACTIVE | OUTPATIENT
Start: 2025-02-06

## 2025-02-06 RX ORDER — ONDANSETRON 2 MG/ML
4 INJECTION INTRAMUSCULAR; INTRAVENOUS EVERY 6 HOURS PRN
Status: DISCONTINUED | OUTPATIENT
Start: 2025-02-06 | End: 2025-02-06 | Stop reason: HOSPADM

## 2025-02-06 RX ORDER — IBUPROFEN 800 MG/1
800 TABLET, FILM COATED ORAL EVERY 6 HOURS PRN
Status: ACTIVE | OUTPATIENT
Start: 2025-02-06

## 2025-02-06 RX ORDER — MISOPROSTOL 200 UG/1
800 TABLET ORAL
Status: DISCONTINUED | OUTPATIENT
Start: 2025-02-06 | End: 2025-02-06 | Stop reason: HOSPADM

## 2025-02-06 RX ORDER — TRANEXAMIC ACID 10 MG/ML
1 INJECTION, SOLUTION INTRAVENOUS EVERY 30 MIN PRN
Status: ACTIVE | OUTPATIENT
Start: 2025-02-06

## 2025-02-06 RX ORDER — IBUPROFEN 800 MG/1
800 TABLET, FILM COATED ORAL
Status: COMPLETED | OUTPATIENT
Start: 2025-02-06 | End: 2025-02-06

## 2025-02-06 RX ORDER — CITRIC ACID/SODIUM CITRATE 334-500MG
30 SOLUTION, ORAL ORAL
Status: DISCONTINUED | OUTPATIENT
Start: 2025-02-06 | End: 2025-02-06 | Stop reason: HOSPADM

## 2025-02-06 RX ORDER — FENTANYL/BUPIVACAINE/NS/PF 2-1250MCG
PLASTIC BAG, INJECTION (ML) INJECTION
Status: COMPLETED
Start: 2025-02-06 | End: 2025-02-06

## 2025-02-06 RX ORDER — MISOPROSTOL 200 UG/1
400 TABLET ORAL
Status: ACTIVE | OUTPATIENT
Start: 2025-02-06

## 2025-02-06 RX ORDER — LOPERAMIDE HYDROCHLORIDE 2 MG/1
4 CAPSULE ORAL
Status: DISCONTINUED | OUTPATIENT
Start: 2025-02-06 | End: 2025-02-06 | Stop reason: HOSPADM

## 2025-02-06 RX ORDER — HYDROCORTISONE 25 MG/G
CREAM TOPICAL 3 TIMES DAILY PRN
Status: ACTIVE | OUTPATIENT
Start: 2025-02-06

## 2025-02-06 RX ORDER — METHYLERGONOVINE MALEATE 0.2 MG/ML
200 INJECTION INTRAVENOUS
Status: DISCONTINUED | OUTPATIENT
Start: 2025-02-06 | End: 2025-02-06 | Stop reason: HOSPADM

## 2025-02-06 RX ORDER — OXYTOCIN/0.9 % SODIUM CHLORIDE 30/500 ML
340 PLASTIC BAG, INJECTION (ML) INTRAVENOUS CONTINUOUS PRN
Status: ACTIVE | OUTPATIENT
Start: 2025-02-06

## 2025-02-06 RX ORDER — MISOPROSTOL 200 UG/1
800 TABLET ORAL
Status: ACTIVE | OUTPATIENT
Start: 2025-02-06

## 2025-02-06 RX ORDER — ACETAMINOPHEN 325 MG/1
650 TABLET ORAL EVERY 4 HOURS PRN
Status: ACTIVE | OUTPATIENT
Start: 2025-02-06

## 2025-02-06 RX ORDER — MISOPROSTOL 200 UG/1
400 TABLET ORAL
Status: DISCONTINUED | OUTPATIENT
Start: 2025-02-06 | End: 2025-02-06 | Stop reason: HOSPADM

## 2025-02-06 RX ORDER — ONDANSETRON 4 MG/1
4 TABLET, ORALLY DISINTEGRATING ORAL EVERY 6 HOURS PRN
Status: DISCONTINUED | OUTPATIENT
Start: 2025-02-06 | End: 2025-02-06 | Stop reason: HOSPADM

## 2025-02-06 RX ORDER — OXYTOCIN/0.9 % SODIUM CHLORIDE 30/500 ML
100-340 PLASTIC BAG, INJECTION (ML) INTRAVENOUS CONTINUOUS PRN
Status: DISPENSED | OUTPATIENT
Start: 2025-02-06

## 2025-02-06 RX ADMIN — Medication: at 20:53

## 2025-02-06 RX ADMIN — SODIUM CHLORIDE, POTASSIUM CHLORIDE, SODIUM LACTATE AND CALCIUM CHLORIDE 1000 ML: 600; 310; 30; 20 INJECTION, SOLUTION INTRAVENOUS at 20:00

## 2025-02-06 RX ADMIN — Medication 340 ML/HR: at 23:19

## 2025-02-06 RX ADMIN — SODIUM CHLORIDE, POTASSIUM CHLORIDE, SODIUM LACTATE AND CALCIUM CHLORIDE: 600; 310; 30; 20 INJECTION, SOLUTION INTRAVENOUS at 21:13

## 2025-02-06 RX ADMIN — KETOROLAC TROMETHAMINE 30 MG: 30 INJECTION, SOLUTION INTRAMUSCULAR at 23:55

## 2025-02-06 ASSESSMENT — ACTIVITIES OF DAILY LIVING (ADL)
ADLS_ACUITY_SCORE: 22

## 2025-02-07 VITALS
WEIGHT: 215 LBS | TEMPERATURE: 98.7 F | OXYGEN SATURATION: 100 % | DIASTOLIC BLOOD PRESSURE: 62 MMHG | RESPIRATION RATE: 18 BRPM | SYSTOLIC BLOOD PRESSURE: 137 MMHG | BODY MASS INDEX: 31.84 KG/M2 | HEIGHT: 69 IN

## 2025-02-07 NOTE — H&P
[facility]   Labor & Delivery H&P    Mathieu Ang YOB: 1990   MRN [id number] Primary OB: [facility]     HPI   Mathieu Ang is a 34 year old  at 40w2d by by LMP c/w 10wk US (NILE 25) presenting in labor. Patient with regular contractions, 4-5/80-90/-1 on arrival.  No leakage of fluid or bleeding.  Good fetal movement.       PREGNANCY HISTORY   OB PROBLEM LIST  History of  x 1  History of  x 1  History of LSO in 1st pregnancy  Mild fetal ventriculomegaly  Class I obesity  Hx 3rd degree laceration    OB History    Para Term  AB Living   3 2 2 0 0 2   SAB IAB Ectopic Multiple Live Births   0 0 0 0 2      # Outcome Date GA Lbr Alexandro/2nd Weight Sex Type Anes PTL Lv   3 Current            2 Term 22 42w6d / 00:13 3.4 kg (7 lb 7.9 oz) F  EPI N PAULA      Name: BRIIFEMALE-MATHIEU      Apgar1: 8  Apgar5: 9   1 Term 21 41w1d  3.09 kg (6 lb 13 oz) M CS-LTranv EPI N PAULA      Name: BRIIMALE-MATHIEU      Apgar1: 8  Apgar5: 9       MATERNAL MEDICAL HISTORY     Past Medical History:   Diagnosis Date    Uncomplicated asthma      Past Surgical History:   Procedure Laterality Date     SECTION N/A 2021    Procedure:  SECTION;  Surgeon: Ashleigh Martinez MD;  Location: [facility] L+D    GYN SURGERY Left     cyst & fallopian tube removal     History reviewed. No pertinent family history.  Social History     Tobacco Use    Smoking status: Never    Smokeless tobacco: Never   Substance Use Topics    Alcohol use: Not Currently    Drug use: Never     Medications Prior to Admission   Medication Sig Dispense Refill Last Dose/Taking    acetaminophen (TYLENOL) 325 MG tablet Take 1-2 tablets (325-650 mg) by mouth every 4 hours as needed for mild pain       acetaminophen (TYLENOL) 325 MG tablet Take 1-2 tablets (325-650 mg) by mouth every 6 hours as needed for mild pain 60 tablet 1     ibuprofen (ADVIL/MOTRIN) 600 MG tablet Take 1 tablet (600 mg)  by mouth every 6 hours as needed for other (cramping) 40 tablet 0     ibuprofen (ADVIL/MOTRIN) 800 MG tablet Take 1 tablet (800 mg) by mouth every 6 hours 60 tablet 1     ondansetron (ZOFRAN) 4 MG tablet Take 1 tablet (4 mg) by mouth every 8 hours as needed for vomiting or nausea. 30 tablet 2     polyethylene glycol (MIRALAX) 17 GM/Dose powder Take 17 g (1 capful) by mouth daily 289 g 0     Prenatal Vit-Fe Fumarate-FA (PRENATAL MULTIVITAMIN W/IRON) 27-0.8 MG tablet Take 1 tablet by mouth daily       senna-docusate (SENOKOT-S/PERICOLACE) 8.6-50 MG tablet Take 1 tablet by mouth 2 times daily as needed for constipation 30 tablet 0      No Known Allergies   OBJECTIVE     Vitals:    25 1939   Resp: 18   Temp: 97.9  F (36.6  C)   TempSrc: Oral       Physical Exam  General: Alert, in no acute distress, resting comfortably in bed.   Neuro: Grossly normal to observation.  Psych: Alert, oriented, affect appropriate.  Cardiovascular: Normal rate, wwp.   Respiratory: Nonlabored breathing, equal chest rise/fall bilaterally.   Abdomen: Gravid, nontender.   Extremities: No pitting edema.   Skin: Color, texture, turgor normal. No concerning rashes or lesions.    SVE: 4-5 / 80-90 / -1 by RN    Fetal Monitoring  FHT: baseline 115, moderate variability, 15x15 accels, no decels  [place]: not tracing well    ASSESSMENT & PLAN   Cinthya Ang is a 34 year old  at 40w2d by by LMP c/w 10wk US (NILE 25) presenting in labor.     #. Labor  - Pain control per patient request. General diet, up ad crystal.   - Augment with Pitocin and/or AROM as needed  - No contraindications to uterotonics    #. Fetal Well Being:   - 1/15 US fetus cephalic  - GBS negative, antibiotics not indicated  - Continuous external fetal monitoring    #. Hx PLTCS  #. Hx   - Desires TOLAC; previously counseled in the office and signed consent 1/15  - Reviewed the less than 1% risk of uterine rupture with 1 prior  section. Reviewed that an emergency   would be recommended if there are any signs/concerns for uterine rupture.  She expressed understanding    #. Prenatal Care:   - Initial OB labs reviewed: O pos, Rubella immune, HIV neg, Hep B neg, Hep C neg, RPR neg  - Genetics/aneuploidy: Declined  - Anatomy ultrasound: L2 on  with bilateral ventriculomegaly and abnormal choroid plexus  - Rh pos, Rhogam not indicated  - , Hgb 12.8, ptls 183, RPR NR  - Declined flu, COVID, RSV, and Tdap vaccines  - GBS neg  - Feeding: breast; RX sent  - Contraception: unsure  - Peds: [facility]     #. Bilateral Ventriculomegaly on L2:  - Declined NIPS and TORCH serologies and fetal MRI  - Referral to [facility] with [facility]: Fetal echo WNL, US on 10/1 with mild ventriculomegaly measuring 10.3 (left) and 9.4 (right) and possible resolving choroid plexus cysts   - Growth with MFM on 12/10: EFW 57%, AC 38%; patient declined growth US at 36w   -  assessment by pediatrics      #. Class I Obesity:  - Pre-pregnancy BMI 32      Brooke Zaiz, MD [facility]  2025 8:13 PM

## 2025-02-07 NOTE — DISCHARGE SUMMARY
[facility]   Discharge Summary    Cinthya Ang YOB: 1990   MRN [id number] Primary OB: [facility]     Date of Admission: 2025   Date of Discharge: {DISCHARGE DATE:159376}   Admitting Physician: Christine Augustin MD   Discharge Physician:  ***      Admission Diagnoses   History of  x 1  History of  x 1  History of LSO in 1st pregnancy  Mild fetal ventriculomegaly  Class I obesity  Hx 3rd degree laceration     Discharge Diagnoses   Same, now delivered    Procedures    on 2025 at 40w2d    Medications Prior to Admission     Medications Prior to Admission   Medication Sig Dispense Refill Last Dose/Taking    acetaminophen (TYLENOL) 325 MG tablet Take 1-2 tablets (325-650 mg) by mouth every 4 hours as needed for mild pain       acetaminophen (TYLENOL) 325 MG tablet Take 1-2 tablets (325-650 mg) by mouth every 6 hours as needed for mild pain 60 tablet 1     ibuprofen (ADVIL/MOTRIN) 600 MG tablet Take 1 tablet (600 mg) by mouth every 6 hours as needed for other (cramping) 40 tablet 0     ibuprofen (ADVIL/MOTRIN) 800 MG tablet Take 1 tablet (800 mg) by mouth every 6 hours 60 tablet 1     ondansetron (ZOFRAN) 4 MG tablet Take 1 tablet (4 mg) by mouth every 8 hours as needed for vomiting or nausea. 30 tablet 2     polyethylene glycol (MIRALAX) 17 GM/Dose powder Take 17 g (1 capful) by mouth daily 289 g 0     Prenatal Vit-Fe Fumarate-FA (PRENATAL MULTIVITAMIN W/IRON) 27-0.8 MG tablet Take 1 tablet by mouth daily       senna-docusate (SENOKOT-S/PERICOLACE) 8.6-50 MG tablet Take 1 tablet by mouth 2 times daily as needed for constipation 30 tablet 0       Discharge Medications     Current Discharge Medication List        CONTINUE these medications which have NOT CHANGED    Details   !! acetaminophen (TYLENOL) 325 MG tablet Take 1-2 tablets (325-650 mg) by mouth every 4 hours as needed for mild pain    Associated Diagnoses:  (vaginal birth after )      !!  acetaminophen (TYLENOL) 325 MG tablet Take 1-2 tablets (325-650 mg) by mouth every 6 hours as needed for mild pain  Qty: 60 tablet, Refills: 1    Associated Diagnoses: S/P primary low transverse       !! ibuprofen (ADVIL/MOTRIN) 600 MG tablet Take 1 tablet (600 mg) by mouth every 6 hours as needed for other (cramping)  Qty: 40 tablet, Refills: 0    Associated Diagnoses:  (vaginal birth after )      !! ibuprofen (ADVIL/MOTRIN) 800 MG tablet Take 1 tablet (800 mg) by mouth every 6 hours  Qty: 60 tablet, Refills: 1    Associated Diagnoses: S/P primary low transverse       ondansetron (ZOFRAN) 4 MG tablet Take 1 tablet (4 mg) by mouth every 8 hours as needed for vomiting or nausea.  Qty: 30 tablet, Refills: 2    Associated Diagnoses: Nausea and vomiting during pregnancy      polyethylene glycol (MIRALAX) 17 GM/Dose powder Take 17 g (1 capful) by mouth daily  Qty: 289 g, Refills: 0    Associated Diagnoses: Third degree laceration of perineum during delivery, postpartum      Prenatal Vit-Fe Fumarate-FA (PRENATAL MULTIVITAMIN W/IRON) 27-0.8 MG tablet Take 1 tablet by mouth daily      senna-docusate (SENOKOT-S/PERICOLACE) 8.6-50 MG tablet Take 1 tablet by mouth 2 times daily as needed for constipation  Qty: 30 tablet, Refills: 0    Associated Diagnoses: S/P primary low transverse        !! - Potential duplicate medications found. Please discuss with provider.         Brief Admission History     From the admit note of Dr. Augustin:     Cinthya Ang is a 34 year old  at 40w2d by by LMP c/w 10wk US (NILE 25) presenting in labor. Patient with regular contractions, 4-5/80-90/-1 on arrival.  No leakage of fluid or bleeding.  Good fetal movement.        Intrapartum Course     From the delivery note of Dr. Augustin:     Cinthya Ang is a 34 year old  who was admitted at 40w2d for labor .  Pregnancy was complicated by prior  x 1,  x 1, history of LSO, history of third-degree  laceration, and bilateral fetal ventriculomegaly.  She was 4-5 cm on arrival. SROM occurred notable for meconium stained fluid. She progressed to fully dilated and began pushing effectively. Pelvis was noted to be adequate.  She delivered a viable female infant with APGARs of 8 and 9 at 1 and 5 minutes, respectively.  Head delivered in an JESSE position, restituted to LOT and delivered without difficulty.  The right anterior shoulder delivered first with gentle axial traction, followed by the left posterior shoulder without complication, and then rest of body. Spontaneous delivery of a placenta with a 3-V cord ensued shortly thereafter.  The placenta was examined and noted to be intact. She received IV pitocin as a uterotonic agent.  Exam of the perineum revealed a second degree laceration, which was repaired in standard fashion using a 3-0 Vicryl suture.  There were bilateral labial lacerations that were hemostatic, not requiring repair.  QBL 150cc.  Infant weight pending at the time of this note.  Both mom and baby are recovering well.      Postpartum Course     The patient's hospital course was unremarkable. She received routine postpartum care and recovered without complication. On PPD#***, her pain was well controlled with PO medications and lochia was stable. She was ambulating, voiding without difficulty, and tolerating a general diet. Breastfeeding*** well. Infant was stable. She plans to use *** for contraception. She was discharged to home in stable condition.     Postpartum Hemoglobin:   Hemoglobin   Date Value Ref Range Status   02/06/2025 12.7 11.7 - 15.7 g/dL Final   05/21/2021 11.1 (L) 11.7 - 15.7 g/dL Final     Rh Status: Positive, Rhogam is not indicated.   Rubella Status: Immune, MMR is not indicated.     Discharge Instructions & Follow Up     Discharge Diet Regular   Discharge Activity Pelvic rest for 6 weeks including no sexual intercourse, tampons, or douching.     Discharge Follow Up Follow up  with primary OB for routine postpartum visit in 6 weeks     Discharge Disposition   Home    ***

## 2025-02-07 NOTE — PROVIDER NOTIFICATION
02/06/25 2053   Provider Notification   Provider Name/Title    Method of Notification At Bedside       Epidural at 2048, pt starting to get comfortable with same. BP's post epidural 129/96, 146/72, pt was shakey and uncomfortable. Denies preE symptoms. FHR low baseline 105-110bpm, moderate variability, accel present, tracing seen by MD. IV fluids infusing.

## 2025-02-07 NOTE — PROVIDER NOTIFICATION
25   Provider Notification   Provider Name/Title    Method of Notification Electronic Page   Notification Reason Patient Arrived       Dr. Demetria hdz informed of patient arrival and assessment including the following:    Reason for maternal/fetal assessment uterine contractions. Patient reports ctx since 173, more regular and uncomfortable, spotting. Hx: C/S x1,  x1, bilat ventriculomegaly on L2 ultrasound. FHR baseline 115bpm, moderate variability, accels present, variable x1. Ctx Q3-5 in 10 mins, palpating moderate, pt breathing through and requesting epidural. SVE 4.5/85/-1. Plan per provider/orders received to admit pt, intrapartum orders, epidural PRN.

## 2025-02-07 NOTE — PLAN OF CARE
Data: Patient presented to [facility]: 2025  7:29 PM.  Reason for maternal/fetal assessment is uterine contractions. Patient reports ctx since 1730, more regular and uncomfortable, spotting. Patient denies leaking of vaginal fluid/rupture of membranes, vomiting, headache, visual disturbances, epigastric or RUQ pain, significant edema. Patient reports fetal movement is normal. Patient is a 40w2d .  Prenatal record reviewed. Pregnancy has been complicated by prev C/Sx1, bilateral ventriculomegaly on L2 ultrasound. .    Vital signs wnl. Support person is present.     Action: Verbal consent for EFM. Triage assessment completed.     Response: Patient verbalized agreement with plan. Will contact  with update and further orders.

## 2025-02-07 NOTE — L&D DELIVERY NOTE
[facility]   Vaginal Delivery Note    Cinthya Ang YOB: 1990   MRN [id number] Primary OB: [facility]       Cinthya Ang is a 34 year old  who was admitted 25 at 40w2d for labor .  Pregnancy was complicated by prior  x 1,  x 1, history of LSO, history of third-degree laceration, and bilateral fetal ventriculomegaly.  She was 4-5 cm on arrival. SROM occurred shortly after arrival notable for meconium stained fluid. She progressed to fully dilated and began pushing effectively. Pelvis was noted to be adequate.  She delivered a viable female infant with APGARs of 8 and 9 at 1 and 5 minutes, respectively.  Head delivered in an JESSE position, restituted to LOT and delivered without difficulty.  The right anterior shoulder delivered first with gentle axial traction, followed by the left posterior shoulder without complication, and then rest of body. Spontaneous delivery of a placenta with a 3-V cord ensued shortly thereafter.  The placenta was examined and noted to be intact. She received IV pitocin as a uterotonic agent.  Exam of the perineum revealed a second degree laceration, which was repaired in standard fashion using a 3-0 Vicryl suture.  There were bilateral labial lacerations that were hemostatic, not requiring repair.  QBL 150cc.  Infant weight 3460g.  Both mom and baby are recovering well.    Brooke Zaiz, MD [facility]  2025 11:59 PM    Delivery Summary    Cinthya Ang MRN# [id number]   Age: 34 year old YOB: 1990          Debby Ang-Cinthya [[id number]]      Labor Event Times      Latent labor onset date/time: 2025 1730    Active labor onset date: 25 Onset time:  9:25 PM   Dilation complete date: 25 Complete time: 10:55 PM   Start pushing date/time: 2025 2308          Labor Length      1st Stage (hrs): 1 (min): 30   2nd Stage (hrs): 0 (min): 21   3rd Stage (hrs): 0 (min): 4          Labor Events      labor?: No   steroids: None  Labor Type: Spontaneous  Predominate monitoring during 1st stage: continuous electronic fetal monitoring     Antibiotics received during labor?: No       Rupture date/time: 25   Rupture type: Spontaneous Rupture of Membranes  Fluid color: Meconium  Fluid odor: Normal     Augmentation: None       Delivery/Placenta Date and Time      Delivery Date: 25 Delivery Time: 11:16 PM   Placenta Date/Time: 2025 11:21 PM  Oxytocin given at the time of delivery: after delivery of baby  Delivering clinician: Christine Augustin MD   Other personnel present at delivery:  Provider Role   Crow Michaels RN Delivery Nurse   Mariah Velasquez RN Delivery Assist             Vaginal Counts       Initial count performed by 2 team members:  Two Team Members   mariah velasquez RN   Dr. Augustin         Sigel Suture Needles Sponges (RETIRED) Instruments   Initial counts 2  5    Added to count       Relief counts       Final counts               Placed during labor Accounted for at the end of labor   FSE No NA   IUPC No NA   Cervidil No NA                             Apgars    Living status: Living   1 Minute 5 Minute 10 Minute 15 Minute 20 Minute   Skin color: 0  1       Heart rate: 2  2       Reflex irritability: 2  2       Muscle tone: 2  2       Respiratory effort: 2  2       Total: 8  9       Apgars assigned by: VON MOORE       Cord      Vessels: 3 Vessels    Cord Complications: None               Cord Blood Disposition: Lab    Gases Sent?: No    Delayed cord clamping?: Yes    Cord Clamping Delay (seconds):  seconds    Stem cell collection?: No           [place] Resuscitation    Methods: None  [place] Care at Delivery: Called to attend the delivery due to meconium stained amniotic fluid. Infant delivered with spontaneous cry and respirations.  NICU team not needed and dismissed.     Gwendolyn Parsons APRN CNP 2025 11:33 PM         Output in Delivery Room: Stool       [place]  "Measurements      Weight: 7 lb 10.1 oz Length: 1' 8\"     Head circumference: 36.8 cm    Output in delivery room: Stool       Skin to Skin and Feeding Plan      Skin to skin initiation date/time: 1841    Skin to skin with: Mother  Skin to skin end date/time:            Labor Events and Shoulder Dystocia    Fetal Tracing Prior to Delivery: Category 2  Fetal Tracing Comments: Overall reassuring  Shoulder dystocia present?: Neg       Delivery (Maternal) (Provider to Complete) (432457)    Episiotomy: None  Perineal lacerations: 2nd Repaired?: Yes     Labial laceration: bilateral Repaired?: No   Repair suture: 3-0 Vicryl  Number of repair packets: 1  Genital tract inspection done: Pos       Blood Loss  Mother: Cinthya Ang #[id number]     Start of Mother's Information      Delivery Blood Loss   Intrapartum & Postpartum: 25 - 25    Delivery Admission: 25 - 25         Intrapartum & Postpartum Delivery Admission    None                  End of Mother's Information  Mother: Cinthya Ang #[id number]                Delivery - Provider to Complete (044219)    Delivering clinician: Christine Augustin MD  Delivery Type (Choose the 1 that will go to the Birth History): , Spontaneous                         Other personnel:  Provider Role   Crow Michaels, RN Delivery Nurse   Mariah Velasquez RN Delivery Assist                    Placenta    Date/Time: 2025 11:21 PM  Removal: Spontaneous  Disposition: Pathology             Anesthesia    Method: Epidural  Cervical dilation at placement: 4-7                    Presentation and Position    Presentation: Vertex    Position: Left Occiput Anterior                     Demetria King MD    "

## 2025-02-07 NOTE — PROVIDER NOTIFICATION
02/06/25 2303   Provider Notification   Provider Name/Title    Method of Notification At Bedside   Request Attend Delivery

## 2025-05-16 NOTE — LACTATION NOTE
Lactation in to see patient. Patent stated baby was nursing well right after delivery and has been sleepy today. Assisted with a feed. Baby eager to eat, at time slipping to the tip of the nipple. Shield applied, removed due to infant gagging. Deeper latch that was sustained in football hold. Encouraged attempting to feed every 2-3 hours, call out for assistance if needed. Cinthya nursed her first and supplemented with formula, wants to do more breastfeeding with this baby. Knows she call call for assistance.   N/A

## (undated) DEVICE — LINEN DRAPE 54X72" 5467

## (undated) DEVICE — SOL WATER IRRIG 1000ML BOTTLE 2F7114

## (undated) DEVICE — LINEN TOWEL PACK X10 5473

## (undated) DEVICE — GLOVE PROTEXIS W/NEU-THERA 6.0  2D73TE60

## (undated) DEVICE — SU MONOCRYL 0 CT-1 36" UND Y946H

## (undated) DEVICE — CAP BABY PINK/BLUE IC-2

## (undated) DEVICE — CATH TRAY FOLEY SURESTEP 16FR DRAIN BAG STATOCK A899916

## (undated) DEVICE — BAG CLEAR TRASH 1.3M 39X33" P4040C

## (undated) DEVICE — SU PLAIN 2-0 CT-1 27" 843H

## (undated) DEVICE — PREP CHLORAPREP 26ML TINTED HI-LITE ORANGE 930815

## (undated) DEVICE — GLOVE PROTEXIS MICRO 6.5  2D73PM65

## (undated) DEVICE — Device

## (undated) DEVICE — LINEN BABY BLANKET 5434

## (undated) DEVICE — SU VICRYL 2-0 CT-1 36" J345H

## (undated) DEVICE — TRANSFER DEVICE BLOOD NDL HOLDER 364880

## (undated) DEVICE — SU VICRYL 0 CT-1 36" J346H

## (undated) DEVICE — SOL NACL 0.9% IRRIG 1000ML BOTTLE 2F7124

## (undated) DEVICE — ESU GROUND PAD ADULT W/CORD E7507

## (undated) DEVICE — ADH SKIN CLOSURE PREMIERPRO EXOFIN 1.0ML 3470

## (undated) DEVICE — PAD CHUX UNDERPAD 30X36" P3036C

## (undated) DEVICE — BLADE CLIPPER SGL USE 9680

## (undated) DEVICE — GLOVE PROTEXIS BLUE W/NEU-THERA 6.5  2D73EB65

## (undated) DEVICE — ESU PENCIL SMOKE EVAC W/ROCKER SWITCH 0703-047-000

## (undated) DEVICE — PACK C-SECTION LF PL15OTA83B

## (undated) DEVICE — LINEN FULL SHEET 5511

## (undated) DEVICE — GLOVE PROTEXIS POWDER FREE SMT 6.0  2D72PT60X

## (undated) DEVICE — LINEN HALF SHEET 5512

## (undated) DEVICE — SU MONOCRYL 4-0 PS-2 27" UND Y426H

## (undated) DEVICE — STOCKING SLEEVE VASOPRESS COMPRESSION CALF MED 18" VP501M

## (undated) DEVICE — SU VICRYL 2-0 CT-1 36" UND J945H

## (undated) RX ORDER — ONDANSETRON 2 MG/ML
INJECTION INTRAMUSCULAR; INTRAVENOUS
Status: DISPENSED
Start: 2021-05-20

## (undated) RX ORDER — OXYTOCIN/0.9 % SODIUM CHLORIDE 30/500 ML
PLASTIC BAG, INJECTION (ML) INTRAVENOUS
Status: DISPENSED
Start: 2021-05-20

## (undated) RX ORDER — FENTANYL CITRATE 50 UG/ML
INJECTION, SOLUTION INTRAMUSCULAR; INTRAVENOUS
Status: DISPENSED
Start: 2021-05-20

## (undated) RX ORDER — FENTANYL CITRATE-0.9 % NACL/PF 10 MCG/ML
PLASTIC BAG, INJECTION (ML) INTRAVENOUS
Status: DISPENSED
Start: 2021-05-20

## (undated) RX ORDER — MORPHINE SULFATE 1 MG/ML
INJECTION, SOLUTION EPIDURAL; INTRATHECAL; INTRAVENOUS
Status: DISPENSED
Start: 2021-05-20

## (undated) RX ORDER — PROPOFOL 10 MG/ML
INJECTION, EMULSION INTRAVENOUS
Status: DISPENSED
Start: 2021-05-20

## (undated) RX ORDER — DEXAMETHASONE SODIUM PHOSPHATE 4 MG/ML
INJECTION, SOLUTION INTRA-ARTICULAR; INTRALESIONAL; INTRAMUSCULAR; INTRAVENOUS; SOFT TISSUE
Status: DISPENSED
Start: 2021-05-20